# Patient Record
Sex: MALE | Race: WHITE | Employment: OTHER
[De-identification: names, ages, dates, MRNs, and addresses within clinical notes are randomized per-mention and may not be internally consistent; named-entity substitution may affect disease eponyms.]

---

## 2024-08-07 ENCOUNTER — APPOINTMENT (OUTPATIENT)
Facility: HOSPITAL | Age: 64
DRG: 308 | End: 2024-08-07
Payer: MEDICARE

## 2024-08-07 ENCOUNTER — HOSPITAL ENCOUNTER (INPATIENT)
Facility: HOSPITAL | Age: 64
LOS: 7 days | Discharge: HOME HEALTH | DRG: 308 | End: 2024-08-14
Attending: EMERGENCY MEDICINE | Admitting: INTERNAL MEDICINE
Payer: MEDICARE

## 2024-08-07 DIAGNOSIS — I48.91 ATRIAL FIBRILLATION WITH RAPID VENTRICULAR RESPONSE (HCC): Primary | ICD-10-CM

## 2024-08-07 DIAGNOSIS — I42.9 CARDIOMYOPATHY, UNSPECIFIED TYPE (HCC): ICD-10-CM

## 2024-08-07 PROBLEM — I10 PRIMARY HYPERTENSION: Chronic | Status: ACTIVE | Noted: 2024-08-07

## 2024-08-07 PROBLEM — J44.9 COPD (CHRONIC OBSTRUCTIVE PULMONARY DISEASE) (HCC): Chronic | Status: ACTIVE | Noted: 2024-08-07

## 2024-08-07 LAB
ALBUMIN SERPL-MCNC: 4.1 G/DL (ref 3.5–5)
ALBUMIN SERPL-MCNC: 4.1 G/DL (ref 3.5–5)
ALBUMIN/GLOB SERPL: 1 (ref 1.1–2.2)
ALBUMIN/GLOB SERPL: 1.1 (ref 1.1–2.2)
ALP SERPL-CCNC: 130 U/L (ref 45–117)
ALP SERPL-CCNC: 136 U/L (ref 45–117)
ALT SERPL-CCNC: 31 U/L (ref 12–78)
ALT SERPL-CCNC: 32 U/L (ref 12–78)
ANION GAP SERPL CALC-SCNC: 6 MMOL/L (ref 5–15)
ANION GAP SERPL CALC-SCNC: 7 MMOL/L (ref 5–15)
APPEARANCE UR: CLEAR
APTT PPP: >153 SEC (ref 21.2–34.1)
AST SERPL W P-5'-P-CCNC: 22 U/L (ref 15–37)
AST SERPL W P-5'-P-CCNC: 27 U/L (ref 15–37)
BACTERIA URNS QL MICRO: NEGATIVE /HPF
BASOPHILS # BLD: 0.1 K/UL (ref 0–0.1)
BASOPHILS NFR BLD: 1 % (ref 0–1)
BILIRUB SERPL-MCNC: 0.9 MG/DL (ref 0.2–1)
BILIRUB SERPL-MCNC: 1.1 MG/DL (ref 0.2–1)
BILIRUB UR QL: NEGATIVE
BUN SERPL-MCNC: 8 MG/DL (ref 6–20)
BUN SERPL-MCNC: 9 MG/DL (ref 6–20)
BUN/CREAT SERPL: 7 (ref 12–20)
BUN/CREAT SERPL: 8 (ref 12–20)
CA-I BLD-MCNC: 9.3 MG/DL (ref 8.5–10.1)
CA-I BLD-MCNC: 9.8 MG/DL (ref 8.5–10.1)
CHLORIDE SERPL-SCNC: 97 MMOL/L (ref 97–108)
CHLORIDE SERPL-SCNC: 97 MMOL/L (ref 97–108)
CO2 SERPL-SCNC: 29 MMOL/L (ref 21–32)
CO2 SERPL-SCNC: 29 MMOL/L (ref 21–32)
COLOR UR: NORMAL
CREAT SERPL-MCNC: 1.11 MG/DL (ref 0.7–1.3)
CREAT SERPL-MCNC: 1.12 MG/DL (ref 0.7–1.3)
D DIMER PPP FEU-MCNC: 0.34 UG/ML(FEU)
D DIMER PPP FEU-MCNC: <0.27 UG/ML(FEU)
DIFFERENTIAL METHOD BLD: ABNORMAL
ECHO AO ASC DIAM: 4.1 CM
ECHO AO ASCENDING AORTA INDEX: 2.46 CM/M2
ECHO AO ROOT DIAM: 4.1 CM
ECHO AO ROOT INDEX: 2.46 CM/M2
ECHO AV AREA PEAK VELOCITY: 2.2 CM2
ECHO AV AREA VTI: 1.6 CM2
ECHO AV AREA/BSA PEAK VELOCITY: 1.3 CM2/M2
ECHO AV AREA/BSA VTI: 1 CM2/M2
ECHO AV MEAN GRADIENT: 2 MMHG
ECHO AV MEAN VELOCITY: 0.7 M/S
ECHO AV PEAK GRADIENT: 3 MMHG
ECHO AV PEAK VELOCITY: 0.9 M/S
ECHO AV VELOCITY RATIO: 0.67
ECHO AV VTI: 15.5 CM
ECHO BSA: 1.66 M2
ECHO EST RA PRESSURE: 8 MMHG
ECHO IVC PROX: 1.9 CM
ECHO LA AREA 2C: 6.7 CM2
ECHO LA AREA 4C: 9.6 CM2
ECHO LA DIAMETER INDEX: 1.62 CM/M2
ECHO LA DIAMETER: 2.7 CM
ECHO LA MAJOR AXIS: 3.7 CM
ECHO LA MINOR AXIS: 2.5 CM
ECHO LA TO AORTIC ROOT RATIO: 0.66
ECHO LA VOL MOD A2C: 14 ML (ref 18–58)
ECHO LA VOL MOD A4C: 19 ML (ref 18–58)
ECHO LA VOLUME INDEX MOD A2C: 8 ML/M2 (ref 16–34)
ECHO LA VOLUME INDEX MOD A4C: 11 ML/M2 (ref 16–34)
ECHO LV E' LATERAL VELOCITY: 11 CM/S
ECHO LV E' SEPTAL VELOCITY: 7 CM/S
ECHO LV FRACTIONAL SHORTENING: 8 % (ref 28–44)
ECHO LV GLOBAL LONGITUDINAL STRAIN (GLS): -3.6 %
ECHO LV GLOBAL LONGITUDINAL STRAIN (GLS): -4.9 %
ECHO LV GLOBAL LONGITUDINAL STRAIN (GLS): -5.4 %
ECHO LV GLOBAL LONGITUDINAL STRAIN (GLS): -5.7 %
ECHO LV INTERNAL DIMENSION DIASTOLE INDEX: 2.34 CM/M2
ECHO LV INTERNAL DIMENSION DIASTOLIC: 3.9 CM (ref 4.2–5.9)
ECHO LV INTERNAL DIMENSION SYSTOLIC INDEX: 2.16 CM/M2
ECHO LV INTERNAL DIMENSION SYSTOLIC: 3.6 CM
ECHO LV IVSD: 1.5 CM (ref 0.6–1)
ECHO LV MASS 2D: 190.4 G (ref 88–224)
ECHO LV MASS INDEX 2D: 114 G/M2 (ref 49–115)
ECHO LV POSTERIOR WALL DIASTOLIC: 1.2 CM (ref 0.6–1)
ECHO LV RELATIVE WALL THICKNESS RATIO: 0.62
ECHO LVOT AREA: 3.1 CM2
ECHO LVOT AV VTI INDEX: 0.5
ECHO LVOT DIAM: 2 CM
ECHO LVOT MEAN GRADIENT: 1 MMHG
ECHO LVOT PEAK GRADIENT: 1 MMHG
ECHO LVOT PEAK VELOCITY: 0.6 M/S
ECHO LVOT STROKE VOLUME INDEX: 14.5 ML/M2
ECHO LVOT SV: 24.2 ML
ECHO LVOT VTI: 7.7 CM
ECHO MV A VELOCITY: 0.38 M/S
ECHO MV AREA VTI: 1.1 CM2
ECHO MV E DECELERATION TIME (DT): 211 MS
ECHO MV E VELOCITY: 0.69 M/S
ECHO MV E/A RATIO: 1.82
ECHO MV E/E' LATERAL: 6.27
ECHO MV E/E' RATIO (AVERAGED): 8.06
ECHO MV E/E' SEPTAL: 9.86
ECHO MV LVOT VTI INDEX: 2.74
ECHO MV MAX VELOCITY: 0.9 M/S
ECHO MV MEAN GRADIENT: 1 MMHG
ECHO MV MEAN VELOCITY: 0.5 M/S
ECHO MV PEAK GRADIENT: 3 MMHG
ECHO MV VTI: 21.1 CM
ECHO PV ACCELERATION TIME (AT): 74 MS
ECHO PV MAX VELOCITY: 0.7 M/S
ECHO PV MEAN GRADIENT: 1 MMHG
ECHO PV MEAN VELOCITY: 0.5 M/S
ECHO PV PEAK GRADIENT: 2 MMHG
ECHO PV VTI: 12.6 CM
ECHO RA AREA 4C: 15.1 CM2
ECHO RA END SYSTOLIC VOLUME APICAL 4 CHAMBER INDEX BSA: 24 ML/M2
ECHO RA VOLUME: 40 ML
ECHO RIGHT VENTRICULAR SYSTOLIC PRESSURE (RVSP): 26 MMHG
ECHO RV BASAL DIMENSION: 3.2 CM
ECHO RV FREE WALL PEAK S': 10 CM/S
ECHO RV LONGITUDINAL DIMENSION: 6 CM
ECHO RV MID DIMENSION: 1.8 CM
ECHO RV TAPSE: 1.3 CM (ref 1.7–?)
ECHO RVOT MEAN GRADIENT: 0 MMHG
ECHO RVOT PEAK GRADIENT: 1 MMHG
ECHO RVOT PEAK VELOCITY: 0.5 M/S
ECHO RVOT VTI: 7.4 CM
ECHO TV REGURGITANT MAX VELOCITY: 2.14 M/S
ECHO TV REGURGITANT PEAK GRADIENT: 18 MMHG
EOSINOPHIL # BLD: 0.2 K/UL (ref 0–0.4)
EOSINOPHIL NFR BLD: 2 % (ref 0–7)
EPITH CASTS URNS QL MICRO: NORMAL /LPF
ERYTHROCYTE [DISTWIDTH] IN BLOOD BY AUTOMATED COUNT: 11.6 % (ref 11.5–14.5)
ERYTHROCYTE [DISTWIDTH] IN BLOOD BY AUTOMATED COUNT: 11.6 % (ref 11.5–14.5)
GLOBULIN SER CALC-MCNC: 3.6 G/DL (ref 2–4)
GLOBULIN SER CALC-MCNC: 4.1 G/DL (ref 2–4)
GLUCOSE SERPL-MCNC: 122 MG/DL (ref 65–100)
GLUCOSE SERPL-MCNC: 140 MG/DL (ref 65–100)
GLUCOSE UR STRIP.AUTO-MCNC: NEGATIVE MG/DL
HCT VFR BLD AUTO: 40.7 % (ref 36.6–50.3)
HCT VFR BLD AUTO: 46.7 % (ref 36.6–50.3)
HGB BLD-MCNC: 14.3 G/DL (ref 12.1–17)
HGB BLD-MCNC: 16.3 G/DL (ref 12.1–17)
HGB UR QL STRIP: NEGATIVE
IMM GRANULOCYTES # BLD AUTO: 0 K/UL (ref 0–0.04)
IMM GRANULOCYTES NFR BLD AUTO: 0 % (ref 0–0.5)
INR PPP: 1 (ref 0.9–1.1)
INR PPP: 1.2 (ref 0.9–1.1)
KETONES UR QL STRIP.AUTO: NEGATIVE MG/DL
LACTATE BLD-SCNC: 1.59 MMOL/L (ref 0.4–2)
LEUKOCYTE ESTERASE UR QL STRIP.AUTO: NEGATIVE
LYMPHOCYTES # BLD: 1.6 K/UL (ref 0.8–3.5)
LYMPHOCYTES NFR BLD: 14 % (ref 12–49)
MAGNESIUM SERPL-MCNC: 2 MG/DL (ref 1.6–2.4)
MAGNESIUM SERPL-MCNC: 2.4 MG/DL (ref 1.6–2.4)
MCH RBC QN AUTO: 31.7 PG (ref 26–34)
MCH RBC QN AUTO: 32.1 PG (ref 26–34)
MCHC RBC AUTO-ENTMCNC: 34.9 G/DL (ref 30–36.5)
MCHC RBC AUTO-ENTMCNC: 35.1 G/DL (ref 30–36.5)
MCV RBC AUTO: 90.7 FL (ref 80–99)
MCV RBC AUTO: 91.5 FL (ref 80–99)
MONOCYTES # BLD: 0.7 K/UL (ref 0–1)
MONOCYTES NFR BLD: 7 % (ref 5–13)
MUCOUS THREADS URNS QL MICRO: NORMAL /LPF
NEUTS SEG # BLD: 8.8 K/UL (ref 1.8–8)
NEUTS SEG NFR BLD: 76 % (ref 32–75)
NITRITE UR QL STRIP.AUTO: NEGATIVE
NRBC # BLD: 0 K/UL (ref 0–0.01)
NRBC # BLD: 0 K/UL (ref 0–0.01)
NRBC BLD-RTO: 0 PER 100 WBC
NRBC BLD-RTO: 0 PER 100 WBC
PERFORMED BY:: NORMAL
PERFORMED BY:: NORMAL
PH UR STRIP: 7 (ref 5–8)
PLATELET # BLD AUTO: 340 K/UL (ref 150–400)
PLATELET # BLD AUTO: 390 K/UL (ref 150–400)
PMV BLD AUTO: 8.1 FL (ref 8.9–12.9)
PMV BLD AUTO: 8.3 FL (ref 8.9–12.9)
POTASSIUM SERPL-SCNC: 3.9 MMOL/L (ref 3.5–5.1)
POTASSIUM SERPL-SCNC: 4.7 MMOL/L (ref 3.5–5.1)
PROCALCITONIN SERPL-MCNC: <0.05 NG/ML
PROCALCITONIN SERPL-MCNC: <0.05 NG/ML
PROT SERPL-MCNC: 7.7 G/DL (ref 6.4–8.2)
PROT SERPL-MCNC: 8.2 G/DL (ref 6.4–8.2)
PROT UR STRIP-MCNC: NEGATIVE MG/DL
PROTHROMBIN TIME: 13 SEC (ref 11.9–14.6)
PROTHROMBIN TIME: 15 SEC (ref 11.9–14.6)
RBC # BLD AUTO: 4.45 M/UL (ref 4.1–5.7)
RBC # BLD AUTO: 5.15 M/UL (ref 4.1–5.7)
RBC #/AREA URNS HPF: NORMAL /HPF (ref 0–5)
SODIUM SERPL-SCNC: 132 MMOL/L (ref 136–145)
SODIUM SERPL-SCNC: 133 MMOL/L (ref 136–145)
SP GR UR REFRACTOMETRY: 1.01 (ref 1–1.03)
T4 FREE SERPL-MCNC: 1 NG/DL (ref 0.8–1.5)
THERAPEUTIC RANGE: ABNORMAL SEC (ref 82–109)
TROPONIN I SERPL HS-MCNC: 60 NG/L (ref 0–76)
TSH SERPL DL<=0.05 MIU/L-ACNC: 1.3 UIU/ML (ref 0.36–3.74)
TSH SERPL DL<=0.05 MIU/L-ACNC: 31.8 UIU/ML (ref 0.36–3.74)
UFH PPP CHRO-ACNC: 0.7 IU/ML
URINE CULTURE IF INDICATED: NORMAL
UROBILINOGEN UR QL STRIP.AUTO: 0.1 EU/DL (ref 0.1–1)
WBC # BLD AUTO: 11.4 K/UL (ref 4.1–11.1)
WBC # BLD AUTO: 11.6 K/UL (ref 4.1–11.1)
WBC URNS QL MICRO: NORMAL /HPF (ref 0–4)

## 2024-08-07 PROCEDURE — 2060000000 HC ICU INTERMEDIATE R&B

## 2024-08-07 PROCEDURE — 36415 COLL VENOUS BLD VENIPUNCTURE: CPT

## 2024-08-07 PROCEDURE — 85610 PROTHROMBIN TIME: CPT

## 2024-08-07 PROCEDURE — 81001 URINALYSIS AUTO W/SCOPE: CPT

## 2024-08-07 PROCEDURE — 6370000000 HC RX 637 (ALT 250 FOR IP): Performed by: NURSE PRACTITIONER

## 2024-08-07 PROCEDURE — 84439 ASSAY OF FREE THYROXINE: CPT

## 2024-08-07 PROCEDURE — 96375 TX/PRO/DX INJ NEW DRUG ADDON: CPT

## 2024-08-07 PROCEDURE — 83735 ASSAY OF MAGNESIUM: CPT

## 2024-08-07 PROCEDURE — 94640 AIRWAY INHALATION TREATMENT: CPT

## 2024-08-07 PROCEDURE — 6370000000 HC RX 637 (ALT 250 FOR IP): Performed by: EMERGENCY MEDICINE

## 2024-08-07 PROCEDURE — 99285 EMERGENCY DEPT VISIT HI MDM: CPT

## 2024-08-07 PROCEDURE — 85027 COMPLETE CBC AUTOMATED: CPT

## 2024-08-07 PROCEDURE — 87040 BLOOD CULTURE FOR BACTERIA: CPT

## 2024-08-07 PROCEDURE — 85520 HEPARIN ASSAY: CPT

## 2024-08-07 PROCEDURE — 96374 THER/PROPH/DIAG INJ IV PUSH: CPT

## 2024-08-07 PROCEDURE — 80053 COMPREHEN METABOLIC PANEL: CPT

## 2024-08-07 PROCEDURE — 84145 PROCALCITONIN (PCT): CPT

## 2024-08-07 PROCEDURE — 2580000003 HC RX 258: Performed by: EMERGENCY MEDICINE

## 2024-08-07 PROCEDURE — 2500000003 HC RX 250 WO HCPCS: Performed by: EMERGENCY MEDICINE

## 2024-08-07 PROCEDURE — 85379 FIBRIN DEGRADATION QUANT: CPT

## 2024-08-07 PROCEDURE — 85025 COMPLETE CBC W/AUTO DIFF WBC: CPT

## 2024-08-07 PROCEDURE — 6360000002 HC RX W HCPCS: Performed by: EMERGENCY MEDICINE

## 2024-08-07 PROCEDURE — 84484 ASSAY OF TROPONIN QUANT: CPT

## 2024-08-07 PROCEDURE — 83605 ASSAY OF LACTIC ACID: CPT

## 2024-08-07 PROCEDURE — 94761 N-INVAS EAR/PLS OXIMETRY MLT: CPT

## 2024-08-07 PROCEDURE — 6360000002 HC RX W HCPCS: Performed by: INTERNAL MEDICINE

## 2024-08-07 PROCEDURE — 84443 ASSAY THYROID STIM HORMONE: CPT

## 2024-08-07 PROCEDURE — 85730 THROMBOPLASTIN TIME PARTIAL: CPT

## 2024-08-07 PROCEDURE — 93306 TTE W/DOPPLER COMPLETE: CPT

## 2024-08-07 PROCEDURE — 93005 ELECTROCARDIOGRAM TRACING: CPT | Performed by: EMERGENCY MEDICINE

## 2024-08-07 PROCEDURE — 71045 X-RAY EXAM CHEST 1 VIEW: CPT

## 2024-08-07 RX ORDER — LISINOPRIL 5 MG/1
5 TABLET ORAL DAILY
Status: DISCONTINUED | OUTPATIENT
Start: 2024-08-07 | End: 2024-08-09

## 2024-08-07 RX ORDER — BUDESONIDE AND FORMOTEROL FUMARATE DIHYDRATE 160; 4.5 UG/1; UG/1
2 AEROSOL RESPIRATORY (INHALATION)
Status: DISCONTINUED | OUTPATIENT
Start: 2024-08-07 | End: 2024-08-14 | Stop reason: HOSPADM

## 2024-08-07 RX ORDER — ACETAMINOPHEN 325 MG/1
650 TABLET ORAL EVERY 6 HOURS PRN
Status: DISCONTINUED | OUTPATIENT
Start: 2024-08-07 | End: 2024-08-14 | Stop reason: HOSPADM

## 2024-08-07 RX ORDER — HEPARIN SODIUM 1000 [USP'U]/ML
60 INJECTION, SOLUTION INTRAVENOUS; SUBCUTANEOUS PRN
Status: DISCONTINUED | OUTPATIENT
Start: 2024-08-07 | End: 2024-08-13 | Stop reason: ALTCHOICE

## 2024-08-07 RX ORDER — POTASSIUM CHLORIDE 20 MEQ/1
40 TABLET, EXTENDED RELEASE ORAL PRN
Status: DISCONTINUED | OUTPATIENT
Start: 2024-08-07 | End: 2024-08-14 | Stop reason: HOSPADM

## 2024-08-07 RX ORDER — IPRATROPIUM BROMIDE AND ALBUTEROL SULFATE 2.5; .5 MG/3ML; MG/3ML
1 SOLUTION RESPIRATORY (INHALATION)
Status: COMPLETED | OUTPATIENT
Start: 2024-08-07 | End: 2024-08-07

## 2024-08-07 RX ORDER — METOPROLOL SUCCINATE 25 MG/1
25 TABLET, EXTENDED RELEASE ORAL DAILY
Status: DISCONTINUED | OUTPATIENT
Start: 2024-08-07 | End: 2024-08-07

## 2024-08-07 RX ORDER — SODIUM CHLORIDE 9 MG/ML
INJECTION, SOLUTION INTRAVENOUS CONTINUOUS
Status: DISCONTINUED | OUTPATIENT
Start: 2024-08-07 | End: 2024-08-09

## 2024-08-07 RX ORDER — ASPIRIN 81 MG/1
81 TABLET ORAL DAILY
COMMUNITY

## 2024-08-07 RX ORDER — ACETAMINOPHEN 650 MG/1
650 SUPPOSITORY RECTAL EVERY 6 HOURS PRN
Status: DISCONTINUED | OUTPATIENT
Start: 2024-08-07 | End: 2024-08-14 | Stop reason: HOSPADM

## 2024-08-07 RX ORDER — POLYETHYLENE GLYCOL 3350 17 G/17G
17 POWDER, FOR SOLUTION ORAL DAILY PRN
Status: DISCONTINUED | OUTPATIENT
Start: 2024-08-07 | End: 2024-08-14 | Stop reason: HOSPADM

## 2024-08-07 RX ORDER — 0.9 % SODIUM CHLORIDE 0.9 %
1000 INTRAVENOUS SOLUTION INTRAVENOUS ONCE
Status: COMPLETED | OUTPATIENT
Start: 2024-08-07 | End: 2024-08-07

## 2024-08-07 RX ORDER — METOPROLOL TARTRATE 1 MG/ML
5 INJECTION, SOLUTION INTRAVENOUS
Status: COMPLETED | OUTPATIENT
Start: 2024-08-07 | End: 2024-08-07

## 2024-08-07 RX ORDER — HEPARIN SODIUM 10000 [USP'U]/100ML
5-30 INJECTION, SOLUTION INTRAVENOUS CONTINUOUS
Status: DISCONTINUED | OUTPATIENT
Start: 2024-08-07 | End: 2024-08-13 | Stop reason: ALTCHOICE

## 2024-08-07 RX ORDER — ALBUTEROL SULFATE 90 UG/1
2 AEROSOL, METERED RESPIRATORY (INHALATION) EVERY 4 HOURS PRN
Status: DISCONTINUED | OUTPATIENT
Start: 2024-08-07 | End: 2024-08-14 | Stop reason: HOSPADM

## 2024-08-07 RX ORDER — POTASSIUM CHLORIDE 7.45 MG/ML
10 INJECTION INTRAVENOUS PRN
Status: DISCONTINUED | OUTPATIENT
Start: 2024-08-07 | End: 2024-08-14 | Stop reason: HOSPADM

## 2024-08-07 RX ORDER — ENOXAPARIN SODIUM 100 MG/ML
40 INJECTION SUBCUTANEOUS DAILY
Status: DISCONTINUED | OUTPATIENT
Start: 2024-08-07 | End: 2024-08-07

## 2024-08-07 RX ORDER — MAGNESIUM SULFATE IN WATER 40 MG/ML
2000 INJECTION, SOLUTION INTRAVENOUS PRN
Status: DISCONTINUED | OUTPATIENT
Start: 2024-08-07 | End: 2024-08-14 | Stop reason: HOSPADM

## 2024-08-07 RX ORDER — ONDANSETRON 2 MG/ML
4 INJECTION INTRAMUSCULAR; INTRAVENOUS EVERY 6 HOURS PRN
Status: DISCONTINUED | OUTPATIENT
Start: 2024-08-07 | End: 2024-08-14 | Stop reason: HOSPADM

## 2024-08-07 RX ORDER — HEPARIN SODIUM 1000 [USP'U]/ML
30 INJECTION, SOLUTION INTRAVENOUS; SUBCUTANEOUS PRN
Status: DISCONTINUED | OUTPATIENT
Start: 2024-08-07 | End: 2024-08-13

## 2024-08-07 RX ORDER — DILTIAZEM HYDROCHLORIDE 5 MG/ML
20 INJECTION INTRAVENOUS ONCE
Status: COMPLETED | OUTPATIENT
Start: 2024-08-07 | End: 2024-08-07

## 2024-08-07 RX ORDER — ONDANSETRON 4 MG/1
4 TABLET, ORALLY DISINTEGRATING ORAL EVERY 8 HOURS PRN
Status: DISCONTINUED | OUTPATIENT
Start: 2024-08-07 | End: 2024-08-14 | Stop reason: HOSPADM

## 2024-08-07 RX ORDER — DIPHENHYDRAMINE HYDROCHLORIDE 50 MG/ML
25 INJECTION INTRAMUSCULAR; INTRAVENOUS
Status: COMPLETED | OUTPATIENT
Start: 2024-08-07 | End: 2024-08-07

## 2024-08-07 RX ORDER — HEPARIN SODIUM 1000 [USP'U]/ML
60 INJECTION, SOLUTION INTRAVENOUS; SUBCUTANEOUS ONCE
Status: COMPLETED | OUTPATIENT
Start: 2024-08-07 | End: 2024-08-07

## 2024-08-07 RX ADMIN — METOPROLOL TARTRATE 5 MG: 5 INJECTION INTRAVENOUS at 13:06

## 2024-08-07 RX ADMIN — AMIODARONE HYDROCHLORIDE 150 MG: 1.5 INJECTION, SOLUTION INTRAVENOUS at 15:11

## 2024-08-07 RX ADMIN — HEPARIN SODIUM 12 UNITS/KG/HR: 10000 INJECTION, SOLUTION INTRAVENOUS at 17:13

## 2024-08-07 RX ADMIN — IPRATROPIUM BROMIDE AND ALBUTEROL SULFATE 1 DOSE: 2.5; .5 SOLUTION RESPIRATORY (INHALATION) at 13:05

## 2024-08-07 RX ADMIN — LISINOPRIL 5 MG: 5 TABLET ORAL at 20:40

## 2024-08-07 RX ADMIN — AMIODARONE HYDROCHLORIDE 0.5 MG/MIN: 1.8 INJECTION, SOLUTION INTRAVENOUS at 20:57

## 2024-08-07 RX ADMIN — DIPHENHYDRAMINE HYDROCHLORIDE 25 MG: 50 INJECTION INTRAMUSCULAR; INTRAVENOUS at 11:38

## 2024-08-07 RX ADMIN — DILTIAZEM HYDROCHLORIDE 20 MG: 5 INJECTION, SOLUTION INTRAVENOUS at 11:12

## 2024-08-07 RX ADMIN — CEFTRIAXONE SODIUM 1000 MG: 1 INJECTION, POWDER, FOR SOLUTION INTRAMUSCULAR; INTRAVENOUS at 12:03

## 2024-08-07 RX ADMIN — METHYLPREDNISOLONE SODIUM SUCCINATE 125 MG: 125 INJECTION INTRAMUSCULAR; INTRAVENOUS at 11:41

## 2024-08-07 RX ADMIN — AMIODARONE HYDROCHLORIDE 1 MG/MIN: 1.8 INJECTION, SOLUTION INTRAVENOUS at 15:38

## 2024-08-07 RX ADMIN — METOPROLOL SUCCINATE 25 MG: 25 TABLET, EXTENDED RELEASE ORAL at 11:33

## 2024-08-07 RX ADMIN — HEPARIN SODIUM 3500 UNITS: 1000 INJECTION INTRAVENOUS; SUBCUTANEOUS at 17:17

## 2024-08-07 RX ADMIN — Medication 2 PUFF: at 20:02

## 2024-08-07 RX ADMIN — METOPROLOL TARTRATE 25 MG: 25 TABLET, FILM COATED ORAL at 20:40

## 2024-08-07 RX ADMIN — SODIUM CHLORIDE 1000 ML: 9 INJECTION, SOLUTION INTRAVENOUS at 11:38

## 2024-08-07 ASSESSMENT — PAIN SCALES - GENERAL
PAINLEVEL_OUTOF10: 0

## 2024-08-07 ASSESSMENT — PAIN - FUNCTIONAL ASSESSMENT: PAIN_FUNCTIONAL_ASSESSMENT: 0-10

## 2024-08-07 ASSESSMENT — LIFESTYLE VARIABLES
HOW MANY STANDARD DRINKS CONTAINING ALCOHOL DO YOU HAVE ON A TYPICAL DAY: PATIENT DOES NOT DRINK
HOW OFTEN DO YOU HAVE A DRINK CONTAINING ALCOHOL: NEVER

## 2024-08-07 NOTE — CARE COORDINATION
08/07/24 1718   Service Assessment   Patient Orientation Alert and Oriented   Cognition Alert   History Provided By Patient   Primary Caregiver Self   Accompanied By/Relationship Pt alone @ this time.   Support Systems Family Members;Friends/Neighbors   Patient's Healthcare Decision Maker is: Legal Next of Kin   PCP Verified by CM Yes  (Seen by Pt 1st MD - Nov 2022.)   Last Visit to PCP   (Nov 2022.)   Prior Functional Level Independent in ADLs/IADLs   Current Functional Level Independent in ADLs/IADLs   Can patient return to prior living arrangement Yes   Ability to make needs known: Good   Family able to assist with home care needs: Yes   Would you like for me to discuss the discharge plan with any other family members/significant others, and if so, who? Yes  (Sister Tess Medina)   Financial Resources Medicare   Community Resources None   CM/SW Referral Other (see comment)  (None)   Social/Functional History   Lives With Friend(s)   Type of Home Trailer   Home Layout One level   Home Access Stairs to enter with rails   Entrance Stairs - Number of Steps 4   Bathroom Shower/Tub Tub/Shower unit   Bathroom Toilet Standard   Bathroom Equipment None   Bathroom Accessibility Accessible   Home Equipment None   Receives Help From Friend(s);Family   ADL Assistance Independent   Homemaking Assistance Independent   Homemaking Responsibilities Yes   Ambulation Assistance Independent   Transfer Assistance Independent   Active  Yes   Occupation Retired   Discharge Planning   Type of Residence Trailer/Mobile Home   Living Arrangements Friends   Current Services Prior To Admission None   Potential Assistance Needed N/A   DME Ordered? No   Type of Home Care Services None   Patient expects to be discharged to: Trailer/mobile home   One/Two Story Residence One story   History of falls? 0   Services At/After Discharge   Transition of Care Consult (CM Consult) Discharge Planning   Services At/After Discharge None

## 2024-08-07 NOTE — H&P
Hospitalist Admission Note    NAME: Andrea Rubio   :  1960   MRN:  [id number]     Date/Time:  2024 3:12 PM    Patient PCP: No primary care provider on file.    ______________________________________________________________________  Given the patient's current clinical presentation, I have a high level of concern for decompensation if discharged from the emergency department.  Complex decision making was performed, which includes reviewing the patient's available past medical records, laboratory results, and x-ray films.       My assessment of this patient's clinical condition and my plan of care is as follows.    Assessment / Plan:    Atrial fibrillation with RVR  - complaining of shortness of breath and palpitations for the past 2-3 days  - Initial EKG: Afib with RVR: ; no ischemic changes  - Repeat EKG: Afib: HR 86, no ischemic changes  - Received IV Diltiazem 20 mg in the ED, HR improved to the 90s  - Developed allergic reaction at insertion site; received IV benadryl with symptom relief  - Received IV bolus of amiodarone and started on amiodarone gtt  - Echo pending  - Check magnesium and K  - Check TSH  - Cardiology consult pending  - CHADSVASC 2; continue 81 mg asa; DOAC pending cardiology recommendations    HTN:   - Blood pressure uncontrolled  - Home medications included: lisinopril but patient has not taken medications cost prohibitive; no insurance  - Received metoprolol 25 mg daily  - will start patient on lisinopril 5 mg daily and titrate as needed    COPD  - Does not appear to be in acute exacerbation  -  ppd smoker 50+ years; quit 1.5 years ago  - Chest x-ray showed no acute process  - Patient was using OTC Primatene Mist; unable to specify frequency  - Will start patient on Symbicort and albuterol inhalers      Medical Decision Making:   I personally reviewed labs: UA, Procal, POC lactic acid, D-dimer, TSH, CMP, Mg, Trop, CBC  I personally reviewed imaging:chest

## 2024-08-07 NOTE — ED PROVIDER NOTES
[facility] EMERGENCY DEPT  EMERGENCY DEPARTMENT HISTORY AND PHYSICAL EXAM      Date: 8/7/2024  Patient Name: Andrea Rubio  MRN: [id number]  Birthdate 1960  Date of evaluation: 8/7/2024  Provider: Bita Horton MD   Note Started: 1:07 PM EDT 8/7/24    HISTORY OF PRESENT ILLNESS     Chief Complaint   Patient presents with    Irregular Heart Beat       History Provided By: Patient    HPI: Andrea Rubio is a 64 y.o. male patient is known no medical medication at home took a Primatene Mist when he felt short of breath and then his heart rate went up to the 180s to 100.  EMS presented gave him a dose of metoprolol did help some.  Is still feeling short of breath but better.    PAST MEDICAL HISTORY   Past Medical History:  No past medical history on file.    Past Surgical History:  No past surgical history on file.    Family History:  No family history on file.    Social History:       Allergies:  Allergies   Allergen Reactions    Cardizem [Diltiazem] Hives and Itching       PCP: No primary care provider on file.    Current Meds:   Current Facility-Administered Medications   Medication Dose Route Frequency Provider Last Rate Last Admin    metoprolol succinate (TOPROL XL) extended release tablet 25 mg  25 mg Oral Daily Bita Horton MD   25 mg at 08/07/24 1133    amiodarone (NEXTERONE) 150 mg in dextrose 5% 100 ml  150 mg IntraVENous Once Bita Horton MD        Followed by    amiodarone (NEXTERONE) 360 mg in dextrose 5% 200 ml  1 mg/min IntraVENous Continuous Bita Horton MD        Followed by    amiodarone (NEXTERONE) 360 mg in dextrose 5% 200 ml  0.5 mg/min IntraVENous Continuous Bita Horton MD         No current outpatient medications on file.       Social Determinants of Health:   Social Determinants of Health     Tobacco Use: Not on file   Alcohol Use: Not At Risk (8/7/2024)    AUDIT-C     Frequency of Alcohol Consumption: Never     Average Number of Drinks: Patient does not drink

## 2024-08-07 NOTE — ED TRIAGE NOTES
Pt arrives to ED by EMS from home. Pt reports he took primatine mist this morning and started feeling SOB and having palpitations. EMS reported HR was 210 with afib RVR. EMS gave 5mg metoprolol. Hx of COPD. No hx of afib

## 2024-08-07 NOTE — ED NOTES
After administration of diltizem, pt developed localized rash/hives to L arm, reports itching, denies SOB. Dr. Horton aware, additional orders received, allergy updated  
Echo at bedside  
Pt had brief period where he went unresponsive, only responsive to painful stimuli. HR noted to be 55bpm, BP 89/65, room air sat of 90%. Dr. Horton at bedside, orders received.   
range)     Or   potassium chloride 10 mEq/100 mL IVPB (Peripheral Line) (has no administration in time range)   magnesium sulfate 2000 mg in 50 mL IVPB premix (has no administration in time range)   ondansetron (ZOFRAN-ODT) disintegrating tablet 4 mg (has no administration in time range)     Or   ondansetron (ZOFRAN) injection 4 mg (has no administration in time range)   polyethylene glycol (GLYCOLAX) packet 17 g (has no administration in time range)   acetaminophen (TYLENOL) tablet 650 mg (has no administration in time range)     Or   acetaminophen (TYLENOL) suppository 650 mg (has no administration in time range)   0.9 % sodium chloride infusion (has no administration in time range)   lisinopril (PRINIVIL;ZESTRIL) tablet 5 mg (has no administration in time range)   budesonide-formoterol (SYMBICORT) 160-4.5 MCG/ACT inhaler 2 puff (has no administration in time range)   albuterol sulfate HFA (PROVENTIL;VENTOLIN;PROAIR) 108 (90 Base) MCG/ACT inhaler 2 puff (has no administration in time range)   heparin (porcine) injection 3,500 Units (has no administration in time range)   heparin (porcine) injection 1,800 Units (has no administration in time range)   heparin 25,000 units in dextrose 5% 250 mL (premix) infusion (12 Units/kg/hr × 59 kg IntraVENous New Bag 8/7/24 1713)   metoprolol tartrate (LOPRESSOR) tablet 25 mg (has no administration in time range)   dilTIAZem injection 20 mg (20 mg IntraVENous Given 8/7/24 1112)   sodium chloride 0.9 % bolus 1,000 mL (0 mLs IntraVENous Stopped 8/7/24 1209)   cefTRIAXone (ROCEPHIN) 1,000 mg in sterile water 10 mL IV syringe (1,000 mg IntraVENous Given 8/7/24 1203)   methylPREDNISolone sodium succ (SOLU-MEDROL) 125 mg in sterile water 2 mL injection (125 mg IntraVENous Given 8/7/24 1141)   diphenhydrAMINE (BENADRYL) injection 25 mg (25 mg IntraVENous Given 8/7/24 1138)   ipratropium 0.5 mg-albuterol 2.5 mg (DUONEB) nebulizer solution 1 Dose (1 Dose Inhalation Given 8/7/24 6811)

## 2024-08-08 LAB
ALBUMIN SERPL-MCNC: 3.4 G/DL (ref 3.5–5)
ALBUMIN/GLOB SERPL: 1 (ref 1.1–2.2)
ALP SERPL-CCNC: 111 U/L (ref 45–117)
ALT SERPL-CCNC: 34 U/L (ref 12–78)
ANION GAP SERPL CALC-SCNC: 5 MMOL/L (ref 5–15)
AST SERPL W P-5'-P-CCNC: 21 U/L (ref 15–37)
BILIRUB SERPL-MCNC: 0.5 MG/DL (ref 0.2–1)
BNP SERPL-MCNC: 1482 PG/ML
BUN SERPL-MCNC: 14 MG/DL (ref 6–20)
BUN/CREAT SERPL: 12 (ref 12–20)
CA-I BLD-MCNC: 9.6 MG/DL (ref 8.5–10.1)
CHLORIDE SERPL-SCNC: 96 MMOL/L (ref 97–108)
CO2 SERPL-SCNC: 29 MMOL/L (ref 21–32)
CREAT SERPL-MCNC: 1.14 MG/DL (ref 0.7–1.3)
EKG ATRIAL RATE: 144 BPM
EKG ATRIAL RATE: 87 BPM
EKG DIAGNOSIS: NORMAL
EKG DIAGNOSIS: NORMAL
EKG Q-T INTERVAL: 298 MS
EKG Q-T INTERVAL: 386 MS
EKG QRS DURATION: 80 MS
EKG QRS DURATION: 88 MS
EKG QTC CALCULATION (BAZETT): 461 MS
EKG QTC CALCULATION (BAZETT): 480 MS
EKG R AXIS: 66 DEGREES
EKG R AXIS: 77 DEGREES
EKG T AXIS: -1 DEGREES
EKG T AXIS: -68 DEGREES
EKG VENTRICULAR RATE: 156 BPM
EKG VENTRICULAR RATE: 86 BPM
GLOBULIN SER CALC-MCNC: 3.4 G/DL (ref 2–4)
GLUCOSE SERPL-MCNC: 161 MG/DL (ref 65–100)
POTASSIUM SERPL-SCNC: 4.2 MMOL/L (ref 3.5–5.1)
PROT SERPL-MCNC: 6.8 G/DL (ref 6.4–8.2)
SODIUM SERPL-SCNC: 130 MMOL/L (ref 136–145)
UFH PPP CHRO-ACNC: 0.16 IU/ML
UFH PPP CHRO-ACNC: 0.24 IU/ML
UFH PPP CHRO-ACNC: 0.33 IU/ML

## 2024-08-08 PROCEDURE — 2500000003 HC RX 250 WO HCPCS: Performed by: EMERGENCY MEDICINE

## 2024-08-08 PROCEDURE — 94640 AIRWAY INHALATION TREATMENT: CPT

## 2024-08-08 PROCEDURE — 6360000002 HC RX W HCPCS

## 2024-08-08 PROCEDURE — 2060000000 HC ICU INTERMEDIATE R&B

## 2024-08-08 PROCEDURE — 80053 COMPREHEN METABOLIC PANEL: CPT

## 2024-08-08 PROCEDURE — 6370000000 HC RX 637 (ALT 250 FOR IP): Performed by: NURSE PRACTITIONER

## 2024-08-08 PROCEDURE — 85520 HEPARIN ASSAY: CPT

## 2024-08-08 PROCEDURE — 94761 N-INVAS EAR/PLS OXIMETRY MLT: CPT

## 2024-08-08 PROCEDURE — 94762 N-INVAS EAR/PLS OXIMTRY CONT: CPT

## 2024-08-08 PROCEDURE — 83880 ASSAY OF NATRIURETIC PEPTIDE: CPT

## 2024-08-08 PROCEDURE — 6360000002 HC RX W HCPCS: Performed by: INTERNAL MEDICINE

## 2024-08-08 PROCEDURE — 36415 COLL VENOUS BLD VENIPUNCTURE: CPT

## 2024-08-08 RX ORDER — SODIUM BICARBONATE 650 MG/1
650 TABLET ORAL 2 TIMES DAILY
Status: DISCONTINUED | OUTPATIENT
Start: 2024-08-08 | End: 2024-08-11

## 2024-08-08 RX ORDER — ALBUTEROL SULFATE 90 UG/1
2 AEROSOL, METERED RESPIRATORY (INHALATION)
Status: ACTIVE | OUTPATIENT
Start: 2024-08-08 | End: 2024-08-08

## 2024-08-08 RX ORDER — FUROSEMIDE 10 MG/ML
40 INJECTION INTRAMUSCULAR; INTRAVENOUS ONCE
Status: COMPLETED | OUTPATIENT
Start: 2024-08-08 | End: 2024-08-08

## 2024-08-08 RX ADMIN — FUROSEMIDE 40 MG: 10 INJECTION, SOLUTION INTRAMUSCULAR; INTRAVENOUS at 15:16

## 2024-08-08 RX ADMIN — Medication 2 PUFF: at 21:08

## 2024-08-08 RX ADMIN — LISINOPRIL 5 MG: 5 TABLET ORAL at 08:38

## 2024-08-08 RX ADMIN — AMIODARONE HYDROCHLORIDE 0.5 MG/MIN: 1.8 INJECTION, SOLUTION INTRAVENOUS at 08:39

## 2024-08-08 RX ADMIN — SODIUM BICARBONATE 650 MG: 650 TABLET ORAL at 08:42

## 2024-08-08 RX ADMIN — Medication 2 PUFF: at 09:45

## 2024-08-08 RX ADMIN — HEPARIN SODIUM 1800 UNITS: 1000 INJECTION INTRAVENOUS; SUBCUTANEOUS at 22:08

## 2024-08-08 RX ADMIN — METOPROLOL TARTRATE 25 MG: 25 TABLET, FILM COATED ORAL at 20:56

## 2024-08-08 RX ADMIN — SODIUM BICARBONATE 650 MG: 650 TABLET ORAL at 20:56

## 2024-08-08 RX ADMIN — AMIODARONE HYDROCHLORIDE 0.5 MG/MIN: 1.8 INJECTION, SOLUTION INTRAVENOUS at 21:05

## 2024-08-08 RX ADMIN — METOPROLOL TARTRATE 25 MG: 25 TABLET, FILM COATED ORAL at 08:38

## 2024-08-08 RX ADMIN — HEPARIN SODIUM 1800 UNITS: 1000 INJECTION INTRAVENOUS; SUBCUTANEOUS at 05:33

## 2024-08-08 ASSESSMENT — PAIN SCALES - GENERAL: PAINLEVEL_OUTOF10: 0

## 2024-08-08 NOTE — PLAN OF CARE
Problem: Pain  Goal: Verbalizes/displays adequate comfort level or baseline comfort level  8/7/2024 2020 by Addis Malloy RN  Outcome: Progressing  8/7/2024 1816 by Marlys Rivera RN  Outcome: Progressing     Problem: Discharge Planning  Goal: Discharge to home or other facility with appropriate resources  8/7/2024 2020 by Addis Malloy RN  Outcome: Progressing  8/7/2024 1816 by Marlys Rivera RN  Outcome: Progressing  Flowsheets (Taken 8/7/2024 1730)  Discharge to home or other facility with appropriate resources:   Identify barriers to discharge with patient and caregiver   Arrange for needed discharge resources and transportation as appropriate   Identify discharge learning needs (meds, wound care, etc)   Refer to discharge planning if patient needs post-hospital services based on physician order or complex needs related to functional status, cognitive ability or social support system

## 2024-08-08 NOTE — CARE COORDINATION
Chart reviewed, DCP remains for patient to d/c from CM to home with friend once medically stable, no CM needs at this time.    CM will continue to follow and monitor for needs.

## 2024-08-08 NOTE — ACP (ADVANCE CARE PLANNING)
Advance Care Planning     Advance Care Planning Inpatient Note  Spiritual Care Department    Today's Date: 8/8/2024  Unit: SSR 4 WEST CARDIAC TELEMETRY    Received request from patient.  Upon review of chart and communication with care team, patient's decision making abilities are not in question.. Patient was/were present in the room during visit.    Goals of ACP Conversation:  Discuss advance care planning documents    Health Care Decision Makers:       Primary Decision Maker: Harini Longoria L - Child - [phone]  Summary:  Completed New Documents    Advance Care Planning Documents (Patient Wishes):  Healthcare Power of /Advance Directive Appointment of Health Care Agent  Living Will/Advance Directive     Assessment:   responded to spiritual consult for pt requesting to complete an Advance Medical Directive (AMD). AMD completed as requested.    Interventions:  Provided education on documents for clarity and greater understanding  Assisted in the completion of documents according to patient's wishes at this time    Care Preferences Communicated:   No    Outcomes/Plan:  ACP Discussion: Completed  New advance directive completed.  Returned original document(s) to patient, as well as copies for distribution to appointed agents  Copy of advance directive given to staff to scan into medical record.    Electronically signed by Chaplain Kiara on 8/8/2024 at 12:12 PM

## 2024-08-08 NOTE — PLAN OF CARE
Patient alert and oriented, remains on Amiodarone and Heparin drips,  Started on sodium bicarb oral for low sodium. Patient understands course of care, and need to remain in hospital due to cardiac activity, heparin assay pending. Patient able to ambulate and make needs known. Call light and table within reach. Patient eating and voiding with no discomfort. Will continue to monitor.  Problem: Discharge Planning  Goal: Discharge to home or other facility with appropriate resources  Outcome: Progressing  Flowsheets (Taken 8/8/2024 1036)  Discharge to home or other facility with appropriate resources: Identify barriers to discharge with patient and caregiver     Problem: Pain  Goal: Verbalizes/displays adequate comfort level or baseline comfort level  Outcome: Progressing

## 2024-08-08 NOTE — CONSULTS
extremity edema  Skin:  Dry, warm  Psych:  Normal affect      Vitals:    08/07/24 1445   BP: (!) 142/102   Pulse: (!) 128   Resp: 21   Temp:    SpO2: 93%       Recent labs results and imaging reviewed.     Discussed case with Dr. Taylor and our impression and recommendations are as follows:  Afib RVR: Continue amiodarone drip. Heparin drip for now. Add metoprolol tartrate 25mg BID. Continue telemetry monitoring. Maintain electrolytes. TSH normal. Check echo. Consider SRINIVASA/DCCV if does not convert.   Hypertension: blood pressure elevated. Monitor with rate control efforts.  COPD: plan per primary team.    Thank you for involving us in the care of this patient.  Please do not hesitate to call if additional questions arise.    MIRANDA Vu  8/7/2024   
08/07/24  1841   WBC 11.4* 11.6*  --    HGB 16.3 14.3  --     340  --    INR 1.0  --  1.2*   APTT  --   --  >153.0*     Recent Labs     08/07/24  1114 08/07/24  1450 08/08/24 0214   * 132* 130*   K 4.7 3.9 4.2   CL 97 97 96*   CO2 29 29 29   GLUCOSE 122* 140* 161*   BUN 8 9 14   CREATININE 1.11 1.12 1.14   CALCIUM 9.8 9.3 9.6   MG 2.4 2.0  --    BILITOT 1.1* 0.9 0.5   AST 27 22 21   ALT 32 31 34     No results for input(s): \"PH\", \"PCO2\", \"PO2\", \"HCO3\", \"FIO2\" in the last 72 hours.  Recent Labs     08/08/24 0214   PROBNP 1,482*     Lab Results   Component Value Date/Time    PROBNP 1,482 08/08/2024 02:14 AM      Lab Results   Component Value Date/Time    TSH 31.80 08/07/2024 03:37 PM       Results       Procedure Component Value Units Date/Time    Blood Culture 1 [[id number]] Collected: 08/07/24 1131    Order Status: Completed Specimen: Blood Updated: 08/08/24 1422     Special Requests --        No Special Requests  Left  Antecubital       Culture No growth 1 day       Blood Culture 2 [[id number]] Collected: 08/07/24 1131    Order Status: Completed Specimen: Blood Updated: 08/08/24 1422     Special Requests No Special Requests        Culture No growth 1 day                Imaging:  Echo (TTE) complete (PRN contrast/bubble/strain/3D)    Result Date: 8/7/2024    Left Ventricle: Severely reduced left ventricular systolic function with a visually estimated EF of 25 - 30%.EF difficult to calculate due to AF. Left ventricle size is normal. Normal wall thickness. Global hypokinesis present. Grade II diastolic dysfunction with increased LAP.   Tricuspid Valve: Mild regurgitation. Est RA pressure is 8 mmHg. The estimated RVSP is 26 mmHg.   Aorta: Dilated aortic root. Ao root diameter is 4.1 cm. Dilated ascending aorta. Ao ascending diameter is 4.1 cm.   Image quality is good.     XR CHEST PORTABLE    Result Date: 8/7/2024  EXAM:  XR CHEST PORTABLE INDICATION: Shortness of breath COMPARISON: none TECHNIQUE: 1127

## 2024-08-09 ENCOUNTER — HOSPITAL ENCOUNTER (OUTPATIENT)
Facility: HOSPITAL | Age: 64
DRG: 308 | End: 2024-08-09
Attending: INTERNAL MEDICINE
Payer: MEDICARE

## 2024-08-09 LAB
ANION GAP SERPL CALC-SCNC: 7 MMOL/L (ref 5–15)
BASOPHILS # BLD: 0 K/UL (ref 0–0.1)
BASOPHILS NFR BLD: 0 % (ref 0–1)
BUN SERPL-MCNC: 21 MG/DL (ref 6–20)
BUN/CREAT SERPL: 20 (ref 12–20)
CA-I BLD-MCNC: 9 MG/DL (ref 8.5–10.1)
CHLORIDE SERPL-SCNC: 93 MMOL/L (ref 97–108)
CO2 SERPL-SCNC: 30 MMOL/L (ref 21–32)
CREAT SERPL-MCNC: 1.04 MG/DL (ref 0.7–1.3)
DIFFERENTIAL METHOD BLD: ABNORMAL
EKG ATRIAL RATE: 65 BPM
EKG DIAGNOSIS: NORMAL
EKG P AXIS: 65 DEGREES
EKG P-R INTERVAL: 114 MS
EKG Q-T INTERVAL: 476 MS
EKG QRS DURATION: 92 MS
EKG QTC CALCULATION (BAZETT): 495 MS
EKG R AXIS: 53 DEGREES
EKG T AXIS: 32 DEGREES
EKG VENTRICULAR RATE: 65 BPM
EOSINOPHIL # BLD: 0.1 K/UL (ref 0–0.4)
EOSINOPHIL NFR BLD: 0 % (ref 0–7)
ERYTHROCYTE [DISTWIDTH] IN BLOOD BY AUTOMATED COUNT: 11.6 % (ref 11.5–14.5)
ERYTHROCYTE [DISTWIDTH] IN BLOOD BY AUTOMATED COUNT: 11.8 % (ref 11.5–14.5)
GLUCOSE SERPL-MCNC: 111 MG/DL (ref 65–100)
HCT VFR BLD AUTO: 36.1 % (ref 36.6–50.3)
HCT VFR BLD AUTO: 40 % (ref 36.6–50.3)
HGB BLD-MCNC: 13 G/DL (ref 12.1–17)
HGB BLD-MCNC: 14.2 G/DL (ref 12.1–17)
IMM GRANULOCYTES # BLD AUTO: 0.1 K/UL (ref 0–0.04)
IMM GRANULOCYTES NFR BLD AUTO: 1 % (ref 0–0.5)
INR PPP: 1 (ref 0.9–1.1)
LYMPHOCYTES # BLD: 2.1 K/UL (ref 0.8–3.5)
LYMPHOCYTES NFR BLD: 10 % (ref 12–49)
MCH RBC QN AUTO: 31.9 PG (ref 26–34)
MCH RBC QN AUTO: 32 PG (ref 26–34)
MCHC RBC AUTO-ENTMCNC: 35.5 G/DL (ref 30–36.5)
MCHC RBC AUTO-ENTMCNC: 36 G/DL (ref 30–36.5)
MCV RBC AUTO: 88.5 FL (ref 80–99)
MCV RBC AUTO: 90.1 FL (ref 80–99)
MONOCYTES # BLD: 0.9 K/UL (ref 0–1)
MONOCYTES NFR BLD: 4 % (ref 5–13)
NEUTS SEG # BLD: 18.3 K/UL (ref 1.8–8)
NEUTS SEG NFR BLD: 85 % (ref 32–75)
NRBC # BLD: 0 K/UL (ref 0–0.01)
NRBC # BLD: 0 K/UL (ref 0–0.01)
NRBC BLD-RTO: 0 PER 100 WBC
NRBC BLD-RTO: 0 PER 100 WBC
PLATELET # BLD AUTO: 338 K/UL (ref 150–400)
PLATELET # BLD AUTO: 382 K/UL (ref 150–400)
PMV BLD AUTO: 8.7 FL (ref 8.9–12.9)
PMV BLD AUTO: 8.9 FL (ref 8.9–12.9)
POTASSIUM SERPL-SCNC: 3.5 MMOL/L (ref 3.5–5.1)
PROTHROMBIN TIME: 13.4 SEC (ref 11.9–14.6)
RBC # BLD AUTO: 4.08 M/UL (ref 4.1–5.7)
RBC # BLD AUTO: 4.44 M/UL (ref 4.1–5.7)
SODIUM SERPL-SCNC: 130 MMOL/L (ref 136–145)
UFH PPP CHRO-ACNC: 0.28 IU/ML
UFH PPP CHRO-ACNC: 0.37 IU/ML
UFH PPP CHRO-ACNC: 0.48 IU/ML
WBC # BLD AUTO: 21.5 K/UL (ref 4.1–11.1)
WBC # BLD AUTO: 22.1 K/UL (ref 4.1–11.1)

## 2024-08-09 PROCEDURE — 6370000000 HC RX 637 (ALT 250 FOR IP): Performed by: NURSE PRACTITIONER

## 2024-08-09 PROCEDURE — 94640 AIRWAY INHALATION TREATMENT: CPT

## 2024-08-09 PROCEDURE — 85520 HEPARIN ASSAY: CPT

## 2024-08-09 PROCEDURE — 93005 ELECTROCARDIOGRAM TRACING: CPT | Performed by: INTERNAL MEDICINE

## 2024-08-09 PROCEDURE — 2060000000 HC ICU INTERMEDIATE R&B

## 2024-08-09 PROCEDURE — 85025 COMPLETE CBC W/AUTO DIFF WBC: CPT

## 2024-08-09 PROCEDURE — 85610 PROTHROMBIN TIME: CPT

## 2024-08-09 PROCEDURE — 94761 N-INVAS EAR/PLS OXIMETRY MLT: CPT

## 2024-08-09 PROCEDURE — 80048 BASIC METABOLIC PNL TOTAL CA: CPT

## 2024-08-09 PROCEDURE — 6360000002 HC RX W HCPCS: Performed by: NURSE PRACTITIONER

## 2024-08-09 PROCEDURE — 2580000003 HC RX 258: Performed by: NURSE PRACTITIONER

## 2024-08-09 PROCEDURE — 36415 COLL VENOUS BLD VENIPUNCTURE: CPT

## 2024-08-09 PROCEDURE — 6370000000 HC RX 637 (ALT 250 FOR IP): Performed by: INTERNAL MEDICINE

## 2024-08-09 PROCEDURE — 6360000002 HC RX W HCPCS: Performed by: INTERNAL MEDICINE

## 2024-08-09 PROCEDURE — 85027 COMPLETE CBC AUTOMATED: CPT

## 2024-08-09 RX ORDER — HYDRALAZINE HYDROCHLORIDE 20 MG/ML
10 INJECTION INTRAMUSCULAR; INTRAVENOUS EVERY 4 HOURS PRN
Status: DISCONTINUED | OUTPATIENT
Start: 2024-08-09 | End: 2024-08-14 | Stop reason: HOSPADM

## 2024-08-09 RX ORDER — LISINOPRIL 10 MG/1
10 TABLET ORAL DAILY
Status: DISCONTINUED | OUTPATIENT
Start: 2024-08-10 | End: 2024-08-11

## 2024-08-09 RX ORDER — SODIUM CHLORIDE 9 MG/ML
INJECTION, SOLUTION INTRAVENOUS CONTINUOUS
Status: DISCONTINUED | OUTPATIENT
Start: 2024-08-09 | End: 2024-08-12

## 2024-08-09 RX ORDER — AMIODARONE HYDROCHLORIDE 200 MG/1
200 TABLET ORAL 2 TIMES DAILY
Status: DISCONTINUED | OUTPATIENT
Start: 2024-08-09 | End: 2024-08-11

## 2024-08-09 RX ORDER — AZITHROMYCIN 500 MG/1
500 TABLET, FILM COATED ORAL DAILY
Status: DISCONTINUED | OUTPATIENT
Start: 2024-08-09 | End: 2024-08-14 | Stop reason: HOSPADM

## 2024-08-09 RX ORDER — LISINOPRIL 5 MG/1
5 TABLET ORAL ONCE
Status: COMPLETED | OUTPATIENT
Start: 2024-08-09 | End: 2024-08-09

## 2024-08-09 RX ORDER — WARFARIN SODIUM 5 MG/1
5 TABLET ORAL ONCE
Status: COMPLETED | OUTPATIENT
Start: 2024-08-09 | End: 2024-08-09

## 2024-08-09 RX ADMIN — AMIODARONE HYDROCHLORIDE 200 MG: 200 TABLET ORAL at 21:12

## 2024-08-09 RX ADMIN — WARFARIN SODIUM 5 MG: 5 TABLET ORAL at 17:00

## 2024-08-09 RX ADMIN — LISINOPRIL 5 MG: 5 TABLET ORAL at 07:48

## 2024-08-09 RX ADMIN — METOPROLOL TARTRATE 25 MG: 25 TABLET, FILM COATED ORAL at 07:48

## 2024-08-09 RX ADMIN — LISINOPRIL 5 MG: 5 TABLET ORAL at 17:00

## 2024-08-09 RX ADMIN — HEPARIN SODIUM 18 UNITS/KG/HR: 10000 INJECTION, SOLUTION INTRAVENOUS at 12:54

## 2024-08-09 RX ADMIN — SODIUM CHLORIDE: 9 INJECTION, SOLUTION INTRAVENOUS at 07:39

## 2024-08-09 RX ADMIN — HEPARIN SODIUM 16 UNITS/KG/HR: 10000 INJECTION, SOLUTION INTRAVENOUS at 00:08

## 2024-08-09 RX ADMIN — SODIUM BICARBONATE 650 MG: 650 TABLET ORAL at 21:12

## 2024-08-09 RX ADMIN — AMIODARONE HYDROCHLORIDE 200 MG: 200 TABLET ORAL at 07:47

## 2024-08-09 RX ADMIN — SODIUM CHLORIDE: 9 INJECTION, SOLUTION INTRAVENOUS at 21:15

## 2024-08-09 RX ADMIN — AZITHROMYCIN DIHYDRATE 500 MG: 500 TABLET ORAL at 17:47

## 2024-08-09 RX ADMIN — Medication 2 PUFF: at 07:58

## 2024-08-09 RX ADMIN — SODIUM BICARBONATE 650 MG: 650 TABLET ORAL at 07:47

## 2024-08-09 RX ADMIN — SODIUM CHLORIDE: 9 INJECTION, SOLUTION INTRAVENOUS at 08:27

## 2024-08-09 RX ADMIN — HEPARIN SODIUM 1800 UNITS: 1000 INJECTION INTRAVENOUS; SUBCUTANEOUS at 12:53

## 2024-08-09 RX ADMIN — HYDRALAZINE HYDROCHLORIDE 10 MG: 20 INJECTION, SOLUTION INTRAMUSCULAR; INTRAVENOUS at 20:02

## 2024-08-09 RX ADMIN — Medication 2 PUFF: at 20:31

## 2024-08-09 RX ADMIN — METOPROLOL TARTRATE 25 MG: 25 TABLET, FILM COATED ORAL at 21:11

## 2024-08-09 NOTE — CARE COORDINATION
Patient needs Nocturnal Home Oxygen.  CM met with patient at bedside to discuss.  Patient is agreeable to CM setting up equipment in the home.  [place] of choice for no preference in company.  CM sent referral to [facility] for processing.

## 2024-08-09 NOTE — PLAN OF CARE
Problem: Discharge Planning  Goal: Discharge to home or other facility with appropriate resources  8/8/2024 2008 by Addis Malloy, RN  Outcome: Progressing  8/8/2024 1214 by Tracey Henning RN  Outcome: Progressing  Flowsheets (Taken 8/8/2024 1036)  Discharge to home or other facility with appropriate resources: Identify barriers to discharge with patient and caregiver     Problem: Pain  Goal: Verbalizes/displays adequate comfort level or baseline comfort level  8/8/2024 2008 by Addis Malloy, RN  Outcome: Progressing  8/8/2024 1214 by Tracye Henning RN  Outcome: Progressing

## 2024-08-09 NOTE — PLAN OF CARE
Problem: Discharge Planning  Goal: Discharge to home or other facility with appropriate resources  8/9/2024 0839 by Christie Villatoro, RN  Outcome: Progressing  8/8/2024 2008 by Addis Malloy RN  Outcome: Progressing     Problem: Pain  Goal: Verbalizes/displays adequate comfort level or baseline comfort level  8/9/2024 0839 by Christie Villatoro, RN  Outcome: Progressing  8/8/2024 2008 by Addis Malloy, RN  Outcome: Progressing

## 2024-08-09 NOTE — CARE COORDINATION
Patient only has medicare part A.  DME is not covered with his insurance.  CM checked with [employer] on private pay price and CM was informed $161,  CM checked with [email or web address] [employer] for private pay cost and CM was informed of $150.  CM notified patient, he would like to go for the cheapest company.  CM has asked [facility] which number the patient should call to get this set up, awaiting response. CM called [facility] at [phone], they informed CM that patient can call the main number at [phone] to set up nocturnal oxygen at home.  CM gave patient this information.

## 2024-08-09 NOTE — NURSE NAVIGATOR
Heart Failure Nurse Navigator: Heart Failure Education    **NEW ONSET HF    RN performs hand hygiene. RN Introduces herself to the patient and informs the patient of the reasoning for the visit.    Patient Verbalizes Understanding for visit, is accepting of discussion    Persons present for Education: Patient    Time Spent: At least 60minutes    Method of teaching:  Teach-back, demonstration, verbal, visual    Education Packets Given: Heart Failure symptom management calendar/tracker, AHA HF education booklet, HF magnet    -Confirmation of working scales & that the patient can read numbers  -Confirmation of support to assist with daily weights if patient unable to perform independently.     RN-NN provided education on Daily weights to include same time daily, on same scale, and documenting weights on calendar. RN-NN provided education trigger management/ signs and symptoms of Heart Failure. Patient is advised on “Yellow Days” to call MD office and on “Red Days” to come to the ER or call 911.   RN provides Nutritional education that includes how to calculate and restrict sodium and fluid intake. Encouraged fresh vegetable choice over canned/processed goods. Demonstrated how to log meals/intake. RN discusses lifestyle changes including cessation of smoking and increasing activity level.   In addition, RN discusses the importance of keeping/scheduling appointments and adherence to guideline directed medical therapies.      Patient was able to teach back to the RN-NN the above information.  Patient will need reinforcement of education provided.  Patient advised about the HF helper Ty.

## 2024-08-10 LAB
INR PPP: 1 (ref 0.9–1.1)
PROTHROMBIN TIME: 13.6 SEC (ref 11.9–14.6)
UFH PPP CHRO-ACNC: 0.31 IU/ML

## 2024-08-10 PROCEDURE — 6370000000 HC RX 637 (ALT 250 FOR IP): Performed by: NURSE PRACTITIONER

## 2024-08-10 PROCEDURE — 6360000002 HC RX W HCPCS: Performed by: NURSE PRACTITIONER

## 2024-08-10 PROCEDURE — 6370000000 HC RX 637 (ALT 250 FOR IP)

## 2024-08-10 PROCEDURE — 85520 HEPARIN ASSAY: CPT

## 2024-08-10 PROCEDURE — 6370000000 HC RX 637 (ALT 250 FOR IP): Performed by: INTERNAL MEDICINE

## 2024-08-10 PROCEDURE — 94640 AIRWAY INHALATION TREATMENT: CPT

## 2024-08-10 PROCEDURE — 85610 PROTHROMBIN TIME: CPT

## 2024-08-10 PROCEDURE — 36415 COLL VENOUS BLD VENIPUNCTURE: CPT

## 2024-08-10 PROCEDURE — 2060000000 HC ICU INTERMEDIATE R&B

## 2024-08-10 PROCEDURE — 6360000002 HC RX W HCPCS: Performed by: INTERNAL MEDICINE

## 2024-08-10 PROCEDURE — 6370000000 HC RX 637 (ALT 250 FOR IP): Performed by: STUDENT IN AN ORGANIZED HEALTH CARE EDUCATION/TRAINING PROGRAM

## 2024-08-10 RX ORDER — WARFARIN SODIUM 5 MG/1
5 TABLET ORAL ONCE
Status: COMPLETED | OUTPATIENT
Start: 2024-08-10 | End: 2024-08-10

## 2024-08-10 RX ADMIN — HYDRALAZINE HYDROCHLORIDE 10 MG: 20 INJECTION, SOLUTION INTRAMUSCULAR; INTRAVENOUS at 03:42

## 2024-08-10 RX ADMIN — Medication 2 PUFF: at 20:57

## 2024-08-10 RX ADMIN — ONDANSETRON 4 MG: 2 INJECTION INTRAMUSCULAR; INTRAVENOUS at 06:07

## 2024-08-10 RX ADMIN — AMIODARONE HYDROCHLORIDE 200 MG: 200 TABLET ORAL at 21:53

## 2024-08-10 RX ADMIN — ONDANSETRON 4 MG: 2 INJECTION INTRAMUSCULAR; INTRAVENOUS at 11:18

## 2024-08-10 RX ADMIN — HEPARIN SODIUM 18 UNITS/KG/HR: 10000 INJECTION, SOLUTION INTRAVENOUS at 01:43

## 2024-08-10 RX ADMIN — AZITHROMYCIN DIHYDRATE 500 MG: 500 TABLET ORAL at 17:34

## 2024-08-10 RX ADMIN — LISINOPRIL 10 MG: 10 TABLET ORAL at 09:03

## 2024-08-10 RX ADMIN — AMIODARONE HYDROCHLORIDE 200 MG: 200 TABLET ORAL at 09:03

## 2024-08-10 RX ADMIN — SODIUM BICARBONATE 650 MG: 650 TABLET ORAL at 09:03

## 2024-08-10 RX ADMIN — WARFARIN SODIUM 5 MG: 5 TABLET ORAL at 17:33

## 2024-08-10 RX ADMIN — Medication 2 PUFF: at 08:41

## 2024-08-10 RX ADMIN — SODIUM BICARBONATE 650 MG: 650 TABLET ORAL at 21:54

## 2024-08-10 RX ADMIN — METOPROLOL TARTRATE 25 MG: 25 TABLET, FILM COATED ORAL at 09:03

## 2024-08-10 RX ADMIN — METOPROLOL TARTRATE 25 MG: 25 TABLET, FILM COATED ORAL at 21:54

## 2024-08-10 ASSESSMENT — PAIN SCALES - GENERAL: PAINLEVEL_OUTOF10: 0

## 2024-08-11 LAB
BASOPHILS # BLD: 0 K/UL (ref 0–0.1)
BASOPHILS NFR BLD: 0 % (ref 0–1)
DIFFERENTIAL METHOD BLD: ABNORMAL
EKG ATRIAL RATE: 66 BPM
EKG DIAGNOSIS: NORMAL
EKG P AXIS: 78 DEGREES
EKG P-R INTERVAL: 136 MS
EKG Q-T INTERVAL: 476 MS
EKG QRS DURATION: 96 MS
EKG QTC CALCULATION (BAZETT): 499 MS
EKG R AXIS: 55 DEGREES
EKG T AXIS: -24 DEGREES
EKG VENTRICULAR RATE: 66 BPM
EOSINOPHIL # BLD: 0.1 K/UL (ref 0–0.4)
EOSINOPHIL NFR BLD: 1 % (ref 0–7)
ERYTHROCYTE [DISTWIDTH] IN BLOOD BY AUTOMATED COUNT: 11.4 % (ref 11.5–14.5)
HCT VFR BLD AUTO: 35.3 % (ref 36.6–50.3)
HGB BLD-MCNC: 12.4 G/DL (ref 12.1–17)
IMM GRANULOCYTES # BLD AUTO: 0 K/UL (ref 0–0.04)
IMM GRANULOCYTES NFR BLD AUTO: 0 % (ref 0–0.5)
INR PPP: 1.4 (ref 0.9–1.1)
LYMPHOCYTES # BLD: 1.9 K/UL (ref 0.8–3.5)
LYMPHOCYTES NFR BLD: 16 % (ref 12–49)
MCH RBC QN AUTO: 31.3 PG (ref 26–34)
MCHC RBC AUTO-ENTMCNC: 35.1 G/DL (ref 30–36.5)
MCV RBC AUTO: 89.1 FL (ref 80–99)
MONOCYTES # BLD: 0.9 K/UL (ref 0–1)
MONOCYTES NFR BLD: 8 % (ref 5–13)
NEUTS SEG # BLD: 9.1 K/UL (ref 1.8–8)
NEUTS SEG NFR BLD: 75 % (ref 32–75)
NRBC # BLD: 0 K/UL (ref 0–0.01)
NRBC BLD-RTO: 0 PER 100 WBC
PLATELET # BLD AUTO: 342 K/UL (ref 150–400)
PMV BLD AUTO: 8.7 FL (ref 8.9–12.9)
PROTHROMBIN TIME: 17.8 SEC (ref 11.9–14.6)
RBC # BLD AUTO: 3.96 M/UL (ref 4.1–5.7)
UFH PPP CHRO-ACNC: 0.21 IU/ML
WBC # BLD AUTO: 12.1 K/UL (ref 4.1–11.1)

## 2024-08-11 PROCEDURE — 2060000000 HC ICU INTERMEDIATE R&B

## 2024-08-11 PROCEDURE — 6370000000 HC RX 637 (ALT 250 FOR IP): Performed by: NURSE PRACTITIONER

## 2024-08-11 PROCEDURE — 6360000002 HC RX W HCPCS: Performed by: INTERNAL MEDICINE

## 2024-08-11 PROCEDURE — 94640 AIRWAY INHALATION TREATMENT: CPT

## 2024-08-11 PROCEDURE — 93005 ELECTROCARDIOGRAM TRACING: CPT | Performed by: NURSE PRACTITIONER

## 2024-08-11 PROCEDURE — 85520 HEPARIN ASSAY: CPT

## 2024-08-11 PROCEDURE — 85610 PROTHROMBIN TIME: CPT

## 2024-08-11 PROCEDURE — 6360000002 HC RX W HCPCS: Performed by: NURSE PRACTITIONER

## 2024-08-11 PROCEDURE — 6370000000 HC RX 637 (ALT 250 FOR IP)

## 2024-08-11 PROCEDURE — 94761 N-INVAS EAR/PLS OXIMETRY MLT: CPT

## 2024-08-11 PROCEDURE — 6370000000 HC RX 637 (ALT 250 FOR IP): Performed by: STUDENT IN AN ORGANIZED HEALTH CARE EDUCATION/TRAINING PROGRAM

## 2024-08-11 PROCEDURE — 6370000000 HC RX 637 (ALT 250 FOR IP): Performed by: INTERNAL MEDICINE

## 2024-08-11 PROCEDURE — 85025 COMPLETE CBC W/AUTO DIFF WBC: CPT

## 2024-08-11 RX ORDER — LISINOPRIL 20 MG/1
20 TABLET ORAL DAILY
Status: DISCONTINUED | OUTPATIENT
Start: 2024-08-12 | End: 2024-08-14 | Stop reason: HOSPADM

## 2024-08-11 RX ORDER — WARFARIN SODIUM 5 MG/1
5 TABLET ORAL ONCE
Status: COMPLETED | OUTPATIENT
Start: 2024-08-11 | End: 2024-08-11

## 2024-08-11 RX ORDER — POTASSIUM CHLORIDE 20 MEQ/1
40 TABLET, EXTENDED RELEASE ORAL ONCE
Status: COMPLETED | OUTPATIENT
Start: 2024-08-11 | End: 2024-08-11

## 2024-08-11 RX ORDER — PROCHLORPERAZINE EDISYLATE 5 MG/ML
10 INJECTION INTRAMUSCULAR; INTRAVENOUS EVERY 6 HOURS PRN
Status: DISCONTINUED | OUTPATIENT
Start: 2024-08-11 | End: 2024-08-14 | Stop reason: HOSPADM

## 2024-08-11 RX ADMIN — POTASSIUM CHLORIDE 40 MEQ: 1500 TABLET, EXTENDED RELEASE ORAL at 11:10

## 2024-08-11 RX ADMIN — LISINOPRIL 10 MG: 10 TABLET ORAL at 08:34

## 2024-08-11 RX ADMIN — AZITHROMYCIN DIHYDRATE 500 MG: 500 TABLET ORAL at 17:26

## 2024-08-11 RX ADMIN — Medication 2 PUFF: at 22:03

## 2024-08-11 RX ADMIN — METOPROLOL TARTRATE 25 MG: 25 TABLET, FILM COATED ORAL at 08:34

## 2024-08-11 RX ADMIN — PROCHLORPERAZINE EDISYLATE 10 MG: 5 INJECTION INTRAMUSCULAR; INTRAVENOUS at 15:31

## 2024-08-11 RX ADMIN — SODIUM BICARBONATE 650 MG: 650 TABLET ORAL at 08:34

## 2024-08-11 RX ADMIN — HEPARIN SODIUM 18 UNITS/KG/HR: 10000 INJECTION, SOLUTION INTRAVENOUS at 05:16

## 2024-08-11 RX ADMIN — Medication 2 PUFF: at 08:38

## 2024-08-11 RX ADMIN — AMIODARONE HYDROCHLORIDE 200 MG: 200 TABLET ORAL at 08:34

## 2024-08-11 RX ADMIN — METOPROLOL TARTRATE 25 MG: 25 TABLET, FILM COATED ORAL at 21:12

## 2024-08-11 RX ADMIN — WARFARIN SODIUM 5 MG: 5 TABLET ORAL at 17:26

## 2024-08-11 ASSESSMENT — PAIN SCALES - GENERAL: PAINLEVEL_OUTOF10: 0

## 2024-08-11 NOTE — PLAN OF CARE
Problem: Discharge Planning  Goal: Discharge to home or other facility with appropriate resources  8/11/2024 0839 by Britt Klein RN  Outcome: Progressing  Flowsheets (Taken 8/11/2024 0743)  Discharge to home or other facility with appropriate resources:   Identify barriers to discharge with patient and caregiver   Arrange for needed discharge resources and transportation as appropriate  8/11/2024 0251 by Zohreh Alvarenga RN  Outcome: Progressing     Problem: Pain  Goal: Verbalizes/displays adequate comfort level or baseline comfort level  8/11/2024 0839 by Britt Klein RN  Outcome: Progressing  Flowsheets (Taken 8/11/2024 0743)  Verbalizes/displays adequate comfort level or baseline comfort level:   Encourage patient to monitor pain and request assistance   Assess pain using appropriate pain scale  8/11/2024 0251 by Zohreh Alvarenga RN  Outcome: Progressing     Problem: Safety - Adult  Goal: Free from fall injury  Outcome: Progressing

## 2024-08-12 ENCOUNTER — APPOINTMENT (OUTPATIENT)
Facility: HOSPITAL | Age: 64
DRG: 308 | End: 2024-08-12
Payer: MEDICARE

## 2024-08-12 LAB
ALBUMIN SERPL-MCNC: 3.2 G/DL (ref 3.5–5)
ALBUMIN/GLOB SERPL: 1.1 (ref 1.1–2.2)
ALP SERPL-CCNC: 92 U/L (ref 45–117)
ALT SERPL-CCNC: 66 U/L (ref 12–78)
ANION GAP SERPL CALC-SCNC: 10 MMOL/L (ref 5–15)
ANION GAP SERPL CALC-SCNC: 7 MMOL/L (ref 5–15)
AST SERPL W P-5'-P-CCNC: 45 U/L (ref 15–37)
BASOPHILS # BLD: 0 K/UL (ref 0–0.1)
BASOPHILS NFR BLD: 0 % (ref 0–1)
BILIRUB SERPL-MCNC: 1 MG/DL (ref 0.2–1)
BNP SERPL-MCNC: 885 PG/ML
BUN SERPL-MCNC: 14 MG/DL (ref 6–20)
BUN SERPL-MCNC: 15 MG/DL (ref 6–20)
BUN/CREAT SERPL: 11 (ref 12–20)
BUN/CREAT SERPL: 13 (ref 12–20)
CA-I BLD-MCNC: 8.5 MG/DL (ref 8.5–10.1)
CA-I BLD-MCNC: 9.6 MG/DL (ref 8.5–10.1)
CHLORIDE SERPL-SCNC: 87 MMOL/L (ref 97–108)
CHLORIDE SERPL-SCNC: 93 MMOL/L (ref 97–108)
CO2 SERPL-SCNC: 27 MMOL/L (ref 21–32)
CO2 SERPL-SCNC: 29 MMOL/L (ref 21–32)
CREAT SERPL-MCNC: 1.16 MG/DL (ref 0.7–1.3)
CREAT SERPL-MCNC: 1.25 MG/DL (ref 0.7–1.3)
DIFFERENTIAL METHOD BLD: ABNORMAL
EOSINOPHIL # BLD: 0.1 K/UL (ref 0–0.4)
EOSINOPHIL NFR BLD: 1 % (ref 0–7)
ERYTHROCYTE [DISTWIDTH] IN BLOOD BY AUTOMATED COUNT: 11.5 % (ref 11.5–14.5)
EST. AVERAGE GLUCOSE BLD GHB EST-MCNC: 123 MG/DL
GLOBULIN SER CALC-MCNC: 2.8 G/DL (ref 2–4)
GLUCOSE BLD STRIP.AUTO-MCNC: 103 MG/DL (ref 65–100)
GLUCOSE BLD STRIP.AUTO-MCNC: 106 MG/DL (ref 65–100)
GLUCOSE BLD STRIP.AUTO-MCNC: 106 MG/DL (ref 65–100)
GLUCOSE BLD STRIP.AUTO-MCNC: 124 MG/DL (ref 65–100)
GLUCOSE SERPL-MCNC: 305 MG/DL (ref 65–100)
GLUCOSE SERPL-MCNC: 84 MG/DL (ref 65–100)
HBA1C MFR BLD: 5.9 % (ref 4–5.6)
HCT VFR BLD AUTO: 36.8 % (ref 36.6–50.3)
HGB BLD-MCNC: 13.2 G/DL (ref 12.1–17)
IMM GRANULOCYTES # BLD AUTO: 0.1 K/UL (ref 0–0.04)
IMM GRANULOCYTES NFR BLD AUTO: 0 % (ref 0–0.5)
INR PPP: 1.9 (ref 0.9–1.1)
LYMPHOCYTES # BLD: 1.6 K/UL (ref 0.8–3.5)
LYMPHOCYTES NFR BLD: 10 % (ref 12–49)
MAGNESIUM SERPL-MCNC: 1.9 MG/DL (ref 1.6–2.4)
MCH RBC QN AUTO: 31.9 PG (ref 26–34)
MCHC RBC AUTO-ENTMCNC: 35.9 G/DL (ref 30–36.5)
MCV RBC AUTO: 88.9 FL (ref 80–99)
MONOCYTES # BLD: 1.2 K/UL (ref 0–1)
MONOCYTES NFR BLD: 7 % (ref 5–13)
NEUTS SEG # BLD: 13.2 K/UL (ref 1.8–8)
NEUTS SEG NFR BLD: 82 % (ref 32–75)
NRBC # BLD: 0 K/UL (ref 0–0.01)
NRBC BLD-RTO: 0 PER 100 WBC
OSMOLALITY SERPL: 270 MOSM/KG H2O
PERFORMED BY:: ABNORMAL
PLATELET # BLD AUTO: 324 K/UL (ref 150–400)
PMV BLD AUTO: 8.5 FL (ref 8.9–12.9)
POTASSIUM SERPL-SCNC: 3.1 MMOL/L (ref 3.5–5.1)
POTASSIUM SERPL-SCNC: 3.5 MMOL/L (ref 3.5–5.1)
PROT SERPL-MCNC: 6 G/DL (ref 6.4–8.2)
PROTHROMBIN TIME: 22.4 SEC (ref 11.9–14.6)
RBC # BLD AUTO: 4.14 M/UL (ref 4.1–5.7)
SODIUM SERPL-SCNC: 124 MMOL/L (ref 136–145)
SODIUM SERPL-SCNC: 129 MMOL/L (ref 136–145)
UFH PPP CHRO-ACNC: 0.53 IU/ML
UFH PPP CHRO-ACNC: <0.1 IU/ML
UFH PPP CHRO-ACNC: >1.1 IU/ML
WBC # BLD AUTO: 16.2 K/UL (ref 4.1–11.1)

## 2024-08-12 PROCEDURE — 85520 HEPARIN ASSAY: CPT

## 2024-08-12 PROCEDURE — 6370000000 HC RX 637 (ALT 250 FOR IP): Performed by: NURSE PRACTITIONER

## 2024-08-12 PROCEDURE — 6370000000 HC RX 637 (ALT 250 FOR IP): Performed by: STUDENT IN AN ORGANIZED HEALTH CARE EDUCATION/TRAINING PROGRAM

## 2024-08-12 PROCEDURE — 6370000000 HC RX 637 (ALT 250 FOR IP): Performed by: HOSPITALIST

## 2024-08-12 PROCEDURE — 2060000000 HC ICU INTERMEDIATE R&B

## 2024-08-12 PROCEDURE — 6360000002 HC RX W HCPCS: Performed by: INTERNAL MEDICINE

## 2024-08-12 PROCEDURE — 83036 HEMOGLOBIN GLYCOSYLATED A1C: CPT

## 2024-08-12 PROCEDURE — 83930 ASSAY OF BLOOD OSMOLALITY: CPT

## 2024-08-12 PROCEDURE — 83735 ASSAY OF MAGNESIUM: CPT

## 2024-08-12 PROCEDURE — 6370000000 HC RX 637 (ALT 250 FOR IP)

## 2024-08-12 PROCEDURE — 85025 COMPLETE CBC W/AUTO DIFF WBC: CPT

## 2024-08-12 PROCEDURE — 80048 BASIC METABOLIC PNL TOTAL CA: CPT

## 2024-08-12 PROCEDURE — 2580000003 HC RX 258

## 2024-08-12 PROCEDURE — 85610 PROTHROMBIN TIME: CPT

## 2024-08-12 PROCEDURE — 94640 AIRWAY INHALATION TREATMENT: CPT

## 2024-08-12 PROCEDURE — 82962 GLUCOSE BLOOD TEST: CPT

## 2024-08-12 PROCEDURE — 36415 COLL VENOUS BLD VENIPUNCTURE: CPT

## 2024-08-12 PROCEDURE — 80053 COMPREHEN METABOLIC PANEL: CPT

## 2024-08-12 PROCEDURE — 83880 ASSAY OF NATRIURETIC PEPTIDE: CPT

## 2024-08-12 RX ORDER — POTASSIUM CHLORIDE 20 MEQ/1
40 TABLET, EXTENDED RELEASE ORAL ONCE
Status: COMPLETED | OUTPATIENT
Start: 2024-08-12 | End: 2024-08-12

## 2024-08-12 RX ORDER — INSULIN GLARGINE 100 [IU]/ML
10 INJECTION, SOLUTION SUBCUTANEOUS DAILY
Status: DISCONTINUED | OUTPATIENT
Start: 2024-08-12 | End: 2024-08-14 | Stop reason: HOSPADM

## 2024-08-12 RX ORDER — GLUCAGON 1 MG/ML
1 KIT INJECTION PRN
Status: DISCONTINUED | OUTPATIENT
Start: 2024-08-12 | End: 2024-08-14 | Stop reason: HOSPADM

## 2024-08-12 RX ORDER — INSULIN LISPRO 100 [IU]/ML
0-4 INJECTION, SOLUTION INTRAVENOUS; SUBCUTANEOUS NIGHTLY
Status: DISCONTINUED | OUTPATIENT
Start: 2024-08-12 | End: 2024-08-14 | Stop reason: HOSPADM

## 2024-08-12 RX ORDER — INSULIN LISPRO 100 [IU]/ML
0-4 INJECTION, SOLUTION INTRAVENOUS; SUBCUTANEOUS
Status: DISCONTINUED | OUTPATIENT
Start: 2024-08-12 | End: 2024-08-14 | Stop reason: HOSPADM

## 2024-08-12 RX ORDER — DEXTROSE MONOHYDRATE 100 MG/ML
INJECTION, SOLUTION INTRAVENOUS CONTINUOUS PRN
Status: DISCONTINUED | OUTPATIENT
Start: 2024-08-12 | End: 2024-08-14 | Stop reason: HOSPADM

## 2024-08-12 RX ORDER — SODIUM CHLORIDE 9 MG/ML
INJECTION, SOLUTION INTRAVENOUS CONTINUOUS
Status: DISPENSED | OUTPATIENT
Start: 2024-08-12 | End: 2024-08-13

## 2024-08-12 RX ADMIN — METOPROLOL TARTRATE 25 MG: 25 TABLET, FILM COATED ORAL at 20:58

## 2024-08-12 RX ADMIN — AZITHROMYCIN DIHYDRATE 500 MG: 500 TABLET ORAL at 17:24

## 2024-08-12 RX ADMIN — HEPARIN SODIUM 15 UNITS/KG/HR: 10000 INJECTION, SOLUTION INTRAVENOUS at 06:07

## 2024-08-12 RX ADMIN — HEPARIN SODIUM 19 UNITS/KG/HR: 10000 INJECTION, SOLUTION INTRAVENOUS at 12:47

## 2024-08-12 RX ADMIN — POTASSIUM CHLORIDE 40 MEQ: 1500 TABLET, EXTENDED RELEASE ORAL at 09:24

## 2024-08-12 RX ADMIN — INSULIN GLARGINE 10 UNITS: 100 INJECTION, SOLUTION SUBCUTANEOUS at 09:24

## 2024-08-12 RX ADMIN — METOPROLOL TARTRATE 25 MG: 25 TABLET, FILM COATED ORAL at 09:24

## 2024-08-12 RX ADMIN — Medication 2 PUFF: at 08:05

## 2024-08-12 RX ADMIN — SODIUM CHLORIDE: 9 INJECTION, SOLUTION INTRAVENOUS at 17:28

## 2024-08-12 RX ADMIN — LISINOPRIL 20 MG: 20 TABLET ORAL at 09:24

## 2024-08-12 RX ADMIN — WARFARIN SODIUM 6 MG: 5 TABLET ORAL at 17:24

## 2024-08-12 RX ADMIN — Medication 2 PUFF: at 20:51

## 2024-08-12 RX ADMIN — POTASSIUM CHLORIDE 40 MEQ: 1500 TABLET, EXTENDED RELEASE ORAL at 17:27

## 2024-08-12 RX ADMIN — HEPARIN SODIUM 3500 UNITS: 1000 INJECTION INTRAVENOUS; SUBCUTANEOUS at 12:48

## 2024-08-12 ASSESSMENT — ENCOUNTER SYMPTOMS
DIARRHEA: 0
SHORTNESS OF BREATH: 0
COLOR CHANGE: 0
COUGH: 0
GASTROINTESTINAL NEGATIVE: 1
BACK PAIN: 0
ABDOMINAL PAIN: 0
CONSTIPATION: 0
SHORTNESS OF BREATH: 1

## 2024-08-12 NOTE — PLAN OF CARE
Problem: Discharge Planning  Goal: Discharge to home or other facility with appropriate resources  8/12/2024 1159 by Marlys Rivera RN  Outcome: Progressing  Flowsheets (Taken 8/12/2024 0928)  Discharge to home or other facility with appropriate resources:   Identify barriers to discharge with patient and caregiver   Arrange for needed discharge resources and transportation as appropriate   Identify discharge learning needs (meds, wound care, etc)   Refer to discharge planning if patient needs post-hospital services based on physician order or complex needs related to functional status, cognitive ability or social support system  8/12/2024 0042 by Hien Baez RN  Outcome: Progressing  Flowsheets (Taken 8/11/2024 2330)  Discharge to home or other facility with appropriate resources: Identify barriers to discharge with patient and caregiver     Problem: Pain  Goal: Verbalizes/displays adequate comfort level or baseline comfort level  8/12/2024 1159 by Marlys Rivera RN  Outcome: Progressing  8/12/2024 0042 by Hien Baez RN  Outcome: Progressing     Problem: Safety - Adult  Goal: Free from fall injury  8/12/2024 1159 by Marlys Rivera RN  Outcome: Progressing  8/12/2024 0042 by Hien Baez, RN  Outcome: Progressing

## 2024-08-12 NOTE — CARE COORDINATION
CM notified by [facility] that they had not heard from patient in order to set up his private pay home O2, CM met with patient to discuss.    Patient reported that he can cover the cost monthly and that he thought he could call after DC however CM explained that he would need to call prior to d/c, number provided to patient.    MEGGAN continues to follow, no other CM DCP needs at this time.

## 2024-08-13 ENCOUNTER — APPOINTMENT (OUTPATIENT)
Facility: HOSPITAL | Age: 64
DRG: 308 | End: 2024-08-13
Payer: MEDICARE

## 2024-08-13 LAB
ALBUMIN SERPL-MCNC: 3.5 G/DL (ref 3.5–5)
ALBUMIN/GLOB SERPL: 1.2 (ref 1.1–2.2)
ALP SERPL-CCNC: 94 U/L (ref 45–117)
ALT SERPL-CCNC: 96 U/L (ref 12–78)
ANION GAP SERPL CALC-SCNC: 3 MMOL/L (ref 5–15)
APPEARANCE UR: CLEAR
AST SERPL W P-5'-P-CCNC: 66 U/L (ref 15–37)
BACTERIA SPEC CULT: NORMAL
BACTERIA SPEC CULT: NORMAL
BACTERIA URNS QL MICRO: NEGATIVE /HPF
BASOPHILS # BLD: 0 K/UL (ref 0–0.1)
BASOPHILS NFR BLD: 0 % (ref 0–1)
BILIRUB SERPL-MCNC: 0.7 MG/DL (ref 0.2–1)
BILIRUB UR QL: NEGATIVE
BUN SERPL-MCNC: 11 MG/DL (ref 6–20)
BUN/CREAT SERPL: 10 (ref 12–20)
CA-I BLD-MCNC: 9.2 MG/DL (ref 8.5–10.1)
CHLORIDE SERPL-SCNC: 98 MMOL/L (ref 97–108)
CO2 SERPL-SCNC: 29 MMOL/L (ref 21–32)
COLOR UR: NORMAL
CREAT SERPL-MCNC: 1.09 MG/DL (ref 0.7–1.3)
DIFFERENTIAL METHOD BLD: ABNORMAL
ECHO BSA: 1.66 M2
ECHO EST RA PRESSURE: 3 MMHG
ECHO LA AREA 4C: 9 CM2
ECHO LA DIAMETER INDEX: 1.68 CM/M2
ECHO LA DIAMETER: 2.8 CM
ECHO LA MAJOR AXIS: 4.3 CM
ECHO LA VOL MOD A4C: 15 ML (ref 18–58)
ECHO LA VOLUME INDEX MOD A4C: 9 ML/M2 (ref 16–34)
ECHO LV E' LATERAL VELOCITY: 14 CM/S
ECHO LV E' SEPTAL VELOCITY: 8 CM/S
ECHO LV EDV A4C: 103 ML
ECHO LV EDV INDEX A4C: 62 ML/M2
ECHO LV EJECTION FRACTION A4C: 45 %
ECHO LV ESV A4C: 56 ML
ECHO LV ESV INDEX A4C: 34 ML/M2
ECHO RA AREA 4C: 15.5 CM2
ECHO RA END SYSTOLIC VOLUME APICAL 4 CHAMBER INDEX BSA: 26 ML/M2
ECHO RA VOLUME: 43 ML
ECHO RIGHT VENTRICULAR SYSTOLIC PRESSURE (RVSP): 19 MMHG
ECHO RV FREE WALL PEAK S': 14 CM/S
ECHO RV TAPSE: 2.1 CM (ref 1.7–?)
ECHO TV REGURGITANT MAX VELOCITY: 2.01 M/S
ECHO TV REGURGITANT PEAK GRADIENT: 16 MMHG
EOSINOPHIL # BLD: 0.2 K/UL (ref 0–0.4)
EOSINOPHIL NFR BLD: 2 % (ref 0–7)
ERYTHROCYTE [DISTWIDTH] IN BLOOD BY AUTOMATED COUNT: 11.6 % (ref 11.5–14.5)
GLOBULIN SER CALC-MCNC: 3 G/DL (ref 2–4)
GLUCOSE BLD STRIP.AUTO-MCNC: 102 MG/DL (ref 65–100)
GLUCOSE BLD STRIP.AUTO-MCNC: 76 MG/DL (ref 65–100)
GLUCOSE BLD STRIP.AUTO-MCNC: 99 MG/DL (ref 65–100)
GLUCOSE SERPL-MCNC: 106 MG/DL (ref 65–100)
GLUCOSE UR STRIP.AUTO-MCNC: NEGATIVE MG/DL
HCT VFR BLD AUTO: 35.3 % (ref 36.6–50.3)
HGB BLD-MCNC: 12.5 G/DL (ref 12.1–17)
HGB UR QL STRIP: NEGATIVE
IMM GRANULOCYTES # BLD AUTO: 0 K/UL (ref 0–0.04)
IMM GRANULOCYTES NFR BLD AUTO: 0 % (ref 0–0.5)
INR PPP: 2.3 (ref 0.9–1.1)
KETONES UR QL STRIP.AUTO: NEGATIVE MG/DL
LEUKOCYTE ESTERASE UR QL STRIP.AUTO: NEGATIVE
LYMPHOCYTES # BLD: 2.9 K/UL (ref 0.8–3.5)
LYMPHOCYTES NFR BLD: 24 % (ref 12–49)
Lab: NORMAL
Lab: NORMAL
MAGNESIUM SERPL-MCNC: 2 MG/DL (ref 1.6–2.4)
MCH RBC QN AUTO: 31.7 PG (ref 26–34)
MCHC RBC AUTO-ENTMCNC: 35.4 G/DL (ref 30–36.5)
MCV RBC AUTO: 89.6 FL (ref 80–99)
MONOCYTES # BLD: 1.2 K/UL (ref 0–1)
MONOCYTES NFR BLD: 10 % (ref 5–13)
MUCOUS THREADS URNS QL MICRO: NEGATIVE /LPF
NEUTS SEG # BLD: 7.6 K/UL (ref 1.8–8)
NEUTS SEG NFR BLD: 64 % (ref 32–75)
NITRITE UR QL STRIP.AUTO: NEGATIVE
NRBC # BLD: 0 K/UL (ref 0–0.01)
NRBC BLD-RTO: 0 PER 100 WBC
OSMOLALITY UR: 302 MOSM/KG H2O
PERFORMED BY:: ABNORMAL
PERFORMED BY:: NORMAL
PERFORMED BY:: NORMAL
PH UR STRIP: 7 (ref 5–8)
PLATELET # BLD AUTO: 357 K/UL (ref 150–400)
PMV BLD AUTO: 8.6 FL (ref 8.9–12.9)
POTASSIUM SERPL-SCNC: 4.3 MMOL/L (ref 3.5–5.1)
PROT SERPL-MCNC: 6.5 G/DL (ref 6.4–8.2)
PROT UR STRIP-MCNC: NEGATIVE MG/DL
PROTHROMBIN TIME: 25.8 SEC (ref 11.9–14.6)
RBC # BLD AUTO: 3.94 M/UL (ref 4.1–5.7)
RBC #/AREA URNS HPF: NORMAL /HPF (ref 0–5)
SODIUM SERPL-SCNC: 130 MMOL/L (ref 136–145)
SODIUM UR-SCNC: 105 MMOL/L
SP GR UR REFRACTOMETRY: 1.01 (ref 1–1.03)
UFH PPP CHRO-ACNC: <0.1 IU/ML
URINE CULTURE IF INDICATED: NORMAL
UROBILINOGEN UR QL STRIP.AUTO: 0.1 EU/DL (ref 0.1–1)
WBC # BLD AUTO: 11.9 K/UL (ref 4.1–11.1)
WBC URNS QL MICRO: NORMAL /HPF (ref 0–4)

## 2024-08-13 PROCEDURE — 83735 ASSAY OF MAGNESIUM: CPT

## 2024-08-13 PROCEDURE — 85025 COMPLETE CBC W/AUTO DIFF WBC: CPT

## 2024-08-13 PROCEDURE — 6370000000 HC RX 637 (ALT 250 FOR IP)

## 2024-08-13 PROCEDURE — 85520 HEPARIN ASSAY: CPT

## 2024-08-13 PROCEDURE — 80053 COMPREHEN METABOLIC PANEL: CPT

## 2024-08-13 PROCEDURE — 83935 ASSAY OF URINE OSMOLALITY: CPT

## 2024-08-13 PROCEDURE — 6370000000 HC RX 637 (ALT 250 FOR IP): Performed by: NURSE PRACTITIONER

## 2024-08-13 PROCEDURE — 85610 PROTHROMBIN TIME: CPT

## 2024-08-13 PROCEDURE — 6360000002 HC RX W HCPCS: Performed by: INTERNAL MEDICINE

## 2024-08-13 PROCEDURE — 6360000004 HC RX CONTRAST MEDICATION

## 2024-08-13 PROCEDURE — 81001 URINALYSIS AUTO W/SCOPE: CPT

## 2024-08-13 PROCEDURE — 84300 ASSAY OF URINE SODIUM: CPT

## 2024-08-13 PROCEDURE — 94761 N-INVAS EAR/PLS OXIMETRY MLT: CPT

## 2024-08-13 PROCEDURE — 6370000000 HC RX 637 (ALT 250 FOR IP): Performed by: HOSPITALIST

## 2024-08-13 PROCEDURE — 2580000003 HC RX 258

## 2024-08-13 PROCEDURE — 6360000002 HC RX W HCPCS: Performed by: NURSE PRACTITIONER

## 2024-08-13 PROCEDURE — C8924 2D TTE W OR W/O FOL W/CON,FU: HCPCS

## 2024-08-13 PROCEDURE — 2060000000 HC ICU INTERMEDIATE R&B

## 2024-08-13 PROCEDURE — 94618 PULMONARY STRESS TESTING: CPT

## 2024-08-13 PROCEDURE — 94640 AIRWAY INHALATION TREATMENT: CPT

## 2024-08-13 PROCEDURE — 82962 GLUCOSE BLOOD TEST: CPT

## 2024-08-13 RX ORDER — HYDRALAZINE HYDROCHLORIDE 50 MG/1
25 TABLET, FILM COATED ORAL EVERY 8 HOURS SCHEDULED
Status: DISCONTINUED | OUTPATIENT
Start: 2024-08-13 | End: 2024-08-14 | Stop reason: HOSPADM

## 2024-08-13 RX ORDER — WARFARIN SODIUM 5 MG/1
5 TABLET ORAL
Status: COMPLETED | OUTPATIENT
Start: 2024-08-13 | End: 2024-08-13

## 2024-08-13 RX ADMIN — HYDRALAZINE HYDROCHLORIDE 25 MG: 50 TABLET ORAL at 14:38

## 2024-08-13 RX ADMIN — AZITHROMYCIN DIHYDRATE 500 MG: 500 TABLET ORAL at 18:04

## 2024-08-13 RX ADMIN — WARFARIN SODIUM 5 MG: 5 TABLET ORAL at 18:04

## 2024-08-13 RX ADMIN — PERFLUTREN 2 ML: 6.52 INJECTION, SUSPENSION INTRAVENOUS at 09:39

## 2024-08-13 RX ADMIN — Medication 2 PUFF: at 20:12

## 2024-08-13 RX ADMIN — Medication 2 PUFF: at 11:15

## 2024-08-13 RX ADMIN — HEPARIN SODIUM 3500 UNITS: 1000 INJECTION INTRAVENOUS; SUBCUTANEOUS at 03:37

## 2024-08-13 RX ADMIN — HYDRALAZINE HYDROCHLORIDE 25 MG: 50 TABLET ORAL at 21:02

## 2024-08-13 RX ADMIN — HYDRALAZINE HYDROCHLORIDE 10 MG: 20 INJECTION, SOLUTION INTRAMUSCULAR; INTRAVENOUS at 03:18

## 2024-08-13 RX ADMIN — LISINOPRIL 20 MG: 20 TABLET ORAL at 10:22

## 2024-08-13 RX ADMIN — METOPROLOL TARTRATE 25 MG: 25 TABLET, FILM COATED ORAL at 10:22

## 2024-08-13 RX ADMIN — HEPARIN SODIUM 23 UNITS/KG/HR: 10000 INJECTION, SOLUTION INTRAVENOUS at 05:35

## 2024-08-13 RX ADMIN — INSULIN GLARGINE 10 UNITS: 100 INJECTION, SOLUTION SUBCUTANEOUS at 10:21

## 2024-08-13 RX ADMIN — METOPROLOL TARTRATE 25 MG: 25 TABLET, FILM COATED ORAL at 21:02

## 2024-08-13 RX ADMIN — SODIUM CHLORIDE: 9 INJECTION, SOLUTION INTRAVENOUS at 03:32

## 2024-08-13 ASSESSMENT — 6 MINUTE WALK TEST (6MWT)
O2 SATURATION: 92
HEART RATE: 75
O2 SATURATION: 95
BORG FATIGUE SCALE SCORE: NOTHING AT ALL
HEART RATE: 74
OXYGEN DEVICE: ROOM AIR
BORG FATIGUE SCALE SCORE: NOTHING AT ALL
BORG FATIGUE SCALE SCORE: NOTHING AT ALL
O2 FLOW RATE (L/MIN): 0
O2 SATURATION: 93
O2 FLOW RATE (L/MIN): 0
DID PATIENT STOP OR PAUSE BEFORE 6 MINUTES?: NO
BORG DYSPNEA SCALE SCORE: NOTHING AT ALL
O2 SATURATION: 95
COMMENTS: ON RA
BORG DYSPNEA SCALE SCORE: NOTHING AT ALL
ANY PROBLEMS WHILE EXERCISING?: NO
HEART RATE: 77
BORG DYSPNEA SCALE SCORE: NOTHING AT ALL

## 2024-08-13 ASSESSMENT — PAIN SCALES - GENERAL: PAINLEVEL_OUTOF10: 0

## 2024-08-13 ASSESSMENT — ENCOUNTER SYMPTOMS
ABDOMINAL PAIN: 0
GASTROINTESTINAL NEGATIVE: 1
BACK PAIN: 0
DIARRHEA: 0
COLOR CHANGE: 0
CONSTIPATION: 0
COUGH: 0
SHORTNESS OF BREATH: 1
SHORTNESS OF BREATH: 0

## 2024-08-13 NOTE — PLAN OF CARE
Problem: Discharge Planning  Goal: Discharge to home or other facility with appropriate resources  8/13/2024 1139 by Marlys Rivera RN  Outcome: Progressing  8/12/2024 2331 by Hien Baez RN  Outcome: Progressing  Flowsheets (Taken 8/12/2024 2015)  Discharge to home or other facility with appropriate resources: Identify barriers to discharge with patient and caregiver     Problem: Pain  Goal: Verbalizes/displays adequate comfort level or baseline comfort level  8/13/2024 1139 by Marlys Rivera, RN  Outcome: Progressing  8/12/2024 2331 by Hien Baez, RN  Outcome: Progressing     Problem: Safety - Adult  Goal: Free from fall injury  8/13/2024 1139 by Marlys Rivera RN  Outcome: Progressing  8/12/2024 2331 by Hien Baez, RN  Outcome: Progressing

## 2024-08-13 NOTE — CARE COORDINATION
CM notified in IDR that patient will be d/c with coumadin and will need frequent INR checks, CM met with patient to discuss HH to provide INR checks, patient agreeable, no preference for HH agency, referral sent, patient accepted with [facility].    IMM delivered with anticipation of d/c tomorrow.

## 2024-08-14 VITALS
BODY MASS INDEX: 21.33 KG/M2 | DIASTOLIC BLOOD PRESSURE: 72 MMHG | HEART RATE: 56 BPM | TEMPERATURE: 97.5 F | WEIGHT: 132.72 LBS | SYSTOLIC BLOOD PRESSURE: 119 MMHG | HEIGHT: 66 IN | RESPIRATION RATE: 18 BRPM | OXYGEN SATURATION: 100 %

## 2024-08-14 LAB
ALBUMIN SERPL-MCNC: 3.2 G/DL (ref 3.5–5)
ALBUMIN/GLOB SERPL: 1 (ref 1.1–2.2)
ALP SERPL-CCNC: 87 U/L (ref 45–117)
ALT SERPL-CCNC: 94 U/L (ref 12–78)
ANION GAP SERPL CALC-SCNC: 7 MMOL/L (ref 5–15)
AST SERPL W P-5'-P-CCNC: 54 U/L (ref 15–37)
BASOPHILS # BLD: 0 K/UL (ref 0–0.1)
BASOPHILS NFR BLD: 0 % (ref 0–1)
BILIRUB SERPL-MCNC: 0.8 MG/DL (ref 0.2–1)
BUN SERPL-MCNC: 9 MG/DL (ref 6–20)
BUN/CREAT SERPL: 9 (ref 12–20)
CA-I BLD-MCNC: 9 MG/DL (ref 8.5–10.1)
CHLORIDE SERPL-SCNC: 95 MMOL/L (ref 97–108)
CO2 SERPL-SCNC: 27 MMOL/L (ref 21–32)
CREAT SERPL-MCNC: 1.01 MG/DL (ref 0.7–1.3)
DIFFERENTIAL METHOD BLD: ABNORMAL
EOSINOPHIL # BLD: 0.2 K/UL (ref 0–0.4)
EOSINOPHIL NFR BLD: 2 % (ref 0–7)
ERYTHROCYTE [DISTWIDTH] IN BLOOD BY AUTOMATED COUNT: 11.8 % (ref 11.5–14.5)
GLOBULIN SER CALC-MCNC: 3.3 G/DL (ref 2–4)
GLUCOSE BLD STRIP.AUTO-MCNC: 124 MG/DL (ref 65–100)
GLUCOSE BLD STRIP.AUTO-MCNC: 98 MG/DL (ref 65–100)
GLUCOSE BLD STRIP.AUTO-MCNC: 99 MG/DL (ref 65–100)
GLUCOSE SERPL-MCNC: 127 MG/DL (ref 65–100)
HCT VFR BLD AUTO: 36.7 % (ref 36.6–50.3)
HGB BLD-MCNC: 13.2 G/DL (ref 12.1–17)
IMM GRANULOCYTES # BLD AUTO: 0 K/UL (ref 0–0.04)
IMM GRANULOCYTES NFR BLD AUTO: 0 % (ref 0–0.5)
INR PPP: 2.6 (ref 0.9–1.1)
LYMPHOCYTES # BLD: 2.2 K/UL (ref 0.8–3.5)
LYMPHOCYTES NFR BLD: 18 % (ref 12–49)
MAGNESIUM SERPL-MCNC: 1.9 MG/DL (ref 1.6–2.4)
MCH RBC QN AUTO: 31.8 PG (ref 26–34)
MCHC RBC AUTO-ENTMCNC: 36 G/DL (ref 30–36.5)
MCV RBC AUTO: 88.4 FL (ref 80–99)
MONOCYTES # BLD: 1.1 K/UL (ref 0–1)
MONOCYTES NFR BLD: 10 % (ref 5–13)
NEUTS SEG # BLD: 8.4 K/UL (ref 1.8–8)
NEUTS SEG NFR BLD: 70 % (ref 32–75)
NRBC # BLD: 0 K/UL (ref 0–0.01)
NRBC BLD-RTO: 0 PER 100 WBC
PERFORMED BY:: ABNORMAL
PERFORMED BY:: NORMAL
PERFORMED BY:: NORMAL
PLATELET # BLD AUTO: 362 K/UL (ref 150–400)
PMV BLD AUTO: 8.5 FL (ref 8.9–12.9)
POTASSIUM SERPL-SCNC: 3.3 MMOL/L (ref 3.5–5.1)
PROT SERPL-MCNC: 6.5 G/DL (ref 6.4–8.2)
PROTHROMBIN TIME: 28.2 SEC (ref 11.9–14.6)
RBC # BLD AUTO: 4.15 M/UL (ref 4.1–5.7)
SODIUM SERPL-SCNC: 129 MMOL/L (ref 136–145)
WBC # BLD AUTO: 12 K/UL (ref 4.1–11.1)

## 2024-08-14 PROCEDURE — 85025 COMPLETE CBC W/AUTO DIFF WBC: CPT

## 2024-08-14 PROCEDURE — 6370000000 HC RX 637 (ALT 250 FOR IP): Performed by: NURSE PRACTITIONER

## 2024-08-14 PROCEDURE — 94640 AIRWAY INHALATION TREATMENT: CPT

## 2024-08-14 PROCEDURE — 83735 ASSAY OF MAGNESIUM: CPT

## 2024-08-14 PROCEDURE — 80053 COMPREHEN METABOLIC PANEL: CPT

## 2024-08-14 PROCEDURE — 6370000000 HC RX 637 (ALT 250 FOR IP)

## 2024-08-14 PROCEDURE — 82962 GLUCOSE BLOOD TEST: CPT

## 2024-08-14 PROCEDURE — 94761 N-INVAS EAR/PLS OXIMETRY MLT: CPT

## 2024-08-14 PROCEDURE — 36415 COLL VENOUS BLD VENIPUNCTURE: CPT

## 2024-08-14 PROCEDURE — 85610 PROTHROMBIN TIME: CPT

## 2024-08-14 PROCEDURE — 94667 MNPJ CHEST WALL 1ST: CPT

## 2024-08-14 RX ORDER — BUDESONIDE AND FORMOTEROL FUMARATE DIHYDRATE 160; 4.5 UG/1; UG/1
2 AEROSOL RESPIRATORY (INHALATION)
Qty: 10.2 G | Refills: 0 | Status: SHIPPED | OUTPATIENT
Start: 2024-08-14

## 2024-08-14 RX ORDER — ALBUTEROL SULFATE 90 UG/1
2 AEROSOL, METERED RESPIRATORY (INHALATION) EVERY 4 HOURS PRN
Qty: 18 G | Refills: 0 | Status: SHIPPED | OUTPATIENT
Start: 2024-08-14

## 2024-08-14 RX ORDER — LISINOPRIL 20 MG/1
20 TABLET ORAL DAILY
Qty: 30 TABLET | Refills: 0 | Status: SHIPPED | OUTPATIENT
Start: 2024-08-15

## 2024-08-14 RX ORDER — HYDRALAZINE HYDROCHLORIDE 25 MG/1
25 TABLET, FILM COATED ORAL EVERY 8 HOURS SCHEDULED
Qty: 90 TABLET | Refills: 0 | Status: SHIPPED | OUTPATIENT
Start: 2024-08-14

## 2024-08-14 RX ORDER — POTASSIUM CHLORIDE 20 MEQ/1
40 TABLET, EXTENDED RELEASE ORAL ONCE
Status: COMPLETED | OUTPATIENT
Start: 2024-08-14 | End: 2024-08-14

## 2024-08-14 RX ADMIN — HYDRALAZINE HYDROCHLORIDE 25 MG: 50 TABLET ORAL at 14:40

## 2024-08-14 RX ADMIN — HYDRALAZINE HYDROCHLORIDE 25 MG: 50 TABLET ORAL at 06:09

## 2024-08-14 RX ADMIN — POTASSIUM CHLORIDE 40 MEQ: 1500 TABLET, EXTENDED RELEASE ORAL at 09:39

## 2024-08-14 RX ADMIN — METOPROLOL TARTRATE 25 MG: 25 TABLET, FILM COATED ORAL at 08:12

## 2024-08-14 RX ADMIN — Medication 2 PUFF: at 08:03

## 2024-08-14 RX ADMIN — LISINOPRIL 20 MG: 20 TABLET ORAL at 08:12

## 2024-08-14 RX ADMIN — INSULIN GLARGINE 10 UNITS: 100 INJECTION, SOLUTION SUBCUTANEOUS at 09:02

## 2024-08-14 ASSESSMENT — ENCOUNTER SYMPTOMS
SHORTNESS OF BREATH: 1
GASTROINTESTINAL NEGATIVE: 1

## 2024-08-14 NOTE — PROGRESS NOTES
CARDIOLOGY PROGRESS NOTE      Patient Name: Andrea Rubio  Age: 64 y.o.  Gender:male  :1960  MRN: [id number]    Patient seen and examined. This is a patient with a history of  COPD, hypertension not on prescription meds due to lack of insurance who presented with shortness of breath and palpitations now being followed for Afib RVR. Ambulating in his room. Converted to sinus rhythm yesterday afternoon. Denies chest pain. Reports breathing at baseline. No other complaints reported.    Telemetry reviewed.    Pertinent review of systems items noted above, all other systems are negative. Current medications reviewed.    Physical Examination    Allergies   Allergen Reactions    Cardizem [Diltiazem] Hives and Itching     Vitals:    24 1217   BP: (!) 163/71   Pulse: 69   Resp: 18   Temp: 97.7 °F (36.5 °C)   SpO2: 97%     Vital signs are stable  No apparent distress.  Heart has an irregularly irregular rhythm  Lungs are coarse   Abdomen is soft, nontender, normal bowel sounds.  Extremities have no edema  Skin is dry and warm.  Normal affect    Labs reviewed:  Recent Results (from the past 12 hour(s))   Basic Metabolic Panel    Collection Time: 24  3:22 AM   Result Value Ref Range    Sodium 130 (L) 136 - 145 mmol/L    Potassium 3.5 3.5 - 5.1 mmol/L    Chloride 93 (L) 97 - 108 mmol/L    CO2 30 21 - 32 mmol/L    Anion Gap 7 5 - 15 mmol/L    Glucose 111 (H) 65 - 100 mg/dL    BUN 21 (H) 6 - 20 mg/dL    Creatinine 1.04 0.70 - 1.30 mg/dL    BUN/Creatinine Ratio 20 12 - 20      Est, Glom Filt Rate 79 >60 ml/min/1.73m2    Calcium 9.0 8.5 - 10.1 mg/dL   CBC    Collection Time: 24  3:22 AM   Result Value Ref Range    WBC 22.1 (H) 4.1 - 11.1 K/uL    RBC 4.08 (L) 4.10 - 5.70 M/uL    Hemoglobin 13.0 12.1 - 17.0 g/dL    Hematocrit 36.1 (L) 36.6 - 50.3 %    MCV 88.5 80.0 - 99.0 FL    MCH 31.9 26.0 - 34.0 PG    MCHC 36.0 30.0 - 36.5 g/dL    RDW 11.6 11.5 - 14.5 %    Platelets 338 150 - 400 K/uL    MPV 8.9 
    CARDIOLOGY PROGRESS NOTE      Patient Name: Andrea Rubio  Age: 64 y.o.  Gender:male  :1960  MRN: [id number]    Patient seen and examined. This is a patient with a history of  COPD, hypertension not on prescription meds due to lack of insurance who presented with shortness of breath and palpitations now being followed for Afib RVR. Converted to sinus rhythm. Denies chest pain. Reports breathing at baseline. No other complaints reported.    Telemetry reviewed. NSR 60's  EKG shows normal sinus rhythm with prolonged Qtc of 495 ms.    Pertinent review of systems items noted above, all other systems are negative. Current medications reviewed.    Physical Examination    Allergies   Allergen Reactions    Cardizem [Diltiazem] Hives and Itching     Vitals:    24 1609   BP: (!) 149/80   Pulse: 66   Resp: 18   Temp: 98.4 °F (36.9 °C)   SpO2: 93%     Vital signs are stable  No apparent distress.  Heart has an irregularly irregular rhythm  Lungs are coarse   Abdomen is soft, nontender, normal bowel sounds.  Extremities have no edema  Skin is dry and warm.  Normal affect    Labs reviewed:  Recent Results (from the past 12 hour(s))   Heparin, Anti-XA    Collection Time: 24  7:52 AM   Result Value Ref Range    Heparin Xa,LMWH and Unfrac 0.21 IU/mL   EKG 12 Lead    Collection Time: 24  8:34 AM   Result Value Ref Range    Ventricular Rate 66 BPM    Atrial Rate 66 BPM    P-R Interval 136 ms    QRS Duration 96 ms    Q-T Interval 476 ms    QTc Calculation (Bazett) 499 ms    P Axis 78 degrees    R Axis 55 degrees    T Axis -24 degrees    Diagnosis       Normal sinus rhythm  Voltage criteria for left ventricular hypertrophy  Abnormal QRS-T angle, consider primary T wave abnormality  Prolonged QT  Abnormal ECG  When compared with ECG of 09-AUG-2024 08:07,  No significant change was found  Confirmed by YEE LORA ([id number]) on 2024 10:41:46 AM          Case discussed with Dr. Lora and our impression 
    CARDIOLOGY PROGRESS NOTE      Patient Name: Andrea Rubio  Age: 64 y.o.  Gender:male  :1960  MRN: [id number]    Patient seen and examined. This is a patient with a history of  COPD, hypertension not on prescription meds due to lack of insurance who presented with shortness of breath and palpitations now being followed for Afib RVR. Converted to sinus rhythm. Denies chest pain. Reports breathing at baseline. No other complaints reported.    Telemetry reviewed. NSR 60's  EKG shows normal sinus rhythm with prolonged Qtc of 495 ms.    Pertinent review of systems items noted above, all other systems are negative. Current medications reviewed.    Physical Examination    Allergies   Allergen Reactions    Cardizem [Diltiazem] Hives and Itching     Vitals:    24 1133   BP: (!) 160/80   Pulse: 68   Resp: 18   Temp: 98.1 °F (36.7 °C)   SpO2: 98%     Vital signs are stable  No apparent distress.  Heart has an irregularly irregular rhythm  Lungs are coarse   Abdomen is soft, nontender, normal bowel sounds.  Extremities have no edema  Skin is dry and warm.  Normal affect    Labs reviewed:  Recent Results (from the past 12 hour(s))   Heparin, Anti-XA    Collection Time: 24  8:03 AM   Result Value Ref Range    Heparin Xa,LMWH and Unfrac <0.10 IU/mL   Brain Natriuretic Peptide    Collection Time: 24  8:03 AM   Result Value Ref Range    NT Pro- (H) <125 pg/mL   Hemoglobin A1c    Collection Time: 24  8:03 AM   Result Value Ref Range    Hemoglobin A1C 5.9 (H) 4.0 - 5.6 %    Estimated Avg Glucose 123 mg/dL   POCT Glucose    Collection Time: 24  9:09 AM   Result Value Ref Range    POC Glucose 124 (H) 65 - 100 mg/dL    Performed by: THALIA TYSON    POCT Glucose    Collection Time: 24 11:31 AM   Result Value Ref Range    POC Glucose 106 (H) 65 - 100 mg/dL    Performed by: THALIA TYSON       Case discussed with Dr. Reyna and our impression and recommendations are as 
    CARDIOLOGY PROGRESS NOTE      Patient Name: Andrea Rubio  Age: 64 y.o.  Gender:male  :1960  MRN: [id number]    Patient seen and examined. This is a patient with a history of  COPD, hypertension not on prescription meds due to lack of insurance who presented with shortness of breath and palpitations now being followed for Afib RVR. Converted to sinus rhythm. Denies chest pain. Reports breathing at baseline. He is eager for discharge. No other complaints reported.    Telemetry reviewed. NSR 80s       Pertinent review of systems items noted above, all other systems are negative. Current medications reviewed.    Physical Examination    Allergies   Allergen Reactions    Cardizem [Diltiazem] Hives and Itching     Vitals:    24 0725   BP: 119/86   Pulse: 93   Resp: 18   Temp: 98.2 °F (36.8 °C)   SpO2:      Vital signs are stable  No apparent distress.  Heart has an irregularly irregular rhythm  Lungs are coarse   Abdomen is soft, nontender, normal bowel sounds.  Extremities have no edema  Skin is dry and warm.  Normal affect    Labs reviewed:  Recent Results (from the past 12 hour(s))   Sodium, urine, random    Collection Time: 24  3:20 AM   Result Value Ref Range    SODIUM, RANDOM URINE 105 mmol/L   Osmolality, Urine    Collection Time: 24  3:20 AM   Result Value Ref Range    Osmolality, Ur 302 MOSM/kg H2O   Urinalysis with Reflex to Culture    Collection Time: 24  3:20 AM    Specimen: Urine   Result Value Ref Range    Color, UA Yellow/Straw      Appearance Clear Clear      Specific Gravity, UA 1.006 1.003 - 1.030      pH, Urine 7.0 5.0 - 8.0      Protein, UA Negative Negative mg/dL    Glucose, Ur Negative Negative mg/dL    Ketones, Urine Negative Negative mg/dL    Bilirubin, Urine Negative Negative      Blood, Urine Negative Negative      Urobilinogen, Urine 0.1 0.1 - 1.0 EU/dL    Nitrite, Urine Negative Negative      Leukocyte Esterase, Urine Negative Negative      WBC, UA 0-4 0 
    CARDIOLOGY PROGRESS NOTE      Patient Name: Andrea Rubio  Age: 64 y.o.  Gender:male  :1960  MRN: [id number]    Patient seen and examined. This is a patient with a history of  COPD, hypertension not on prescription meds due to lack of insurance who presented with shortness of breath and palpitations now being followed for Afib RVR. Resting comfortably in bed. Remains in Afib, rates 78-130s. Denies chest pain. Reports breathing at baseline. No other complaints reported.    Telemetry reviewed.    Pertinent review of systems items noted above, all other systems are negative. Current medications reviewed.    Physical Examination    Allergies   Allergen Reactions    Cardizem [Diltiazem] Hives and Itching     Vitals:    24 1228   BP: (!) 123/94   Pulse: 100   Resp: 18   Temp: 98.6 °F (37 °C)   SpO2: 98%     Vital signs are stable  No apparent distress.  Heart has an irregularly irregular rhythm  Lungs are coarse   Abdomen is soft, nontender, normal bowel sounds.  Extremities have no edema  Skin is dry and warm.  Normal affect    Labs reviewed:  Recent Results (from the past 12 hour(s))   Heparin, Anti-XA    Collection Time: 24 11:52 AM   Result Value Ref Range    Heparin Xa,LMWH and Unfrac 0.33 IU/mL        Case discussed with Dr. Taylor and our impression and recommendations are as follows:  Afib RVR, rates improved   Up to 120s acceptable  Continue amiodarone drip  Continue heparin drip, look to transition to OAC at discharge   Continue metoprolol tartrate 25mg BID  Continue telemetry monitoring  Maintain electrolytes  TSH normal  Tentatively plan for SRINIVASA/DCCV tomorrow afternoon if does not convert.   HFrEF, echo this admission with EF 25-30%, NWM   Global HK present  Will need ischemic evaluation IP vs OP pending course    GDMT as able, Lisinopril and BB on board   BNP 1482   Will give dose of IV Lasix today  Need strict I&Os, daily weights   Hypertension: BP stable. Continue current regimen 
    Hospitalist Progress Note    NAME:   Andrea Rubio   : 1960   MRN: [id number]     Date/Time: 2024 2:09 PM  Patient PCP: No primary care provider on file.    Estimated discharge date: 24-48 hours  Barriers: therapeutic INR    HOSPITAL COURSE:    Andrea Rubio is a 64 y.o.  male with hypertension and COPD who presented to the ED complaining of shortness of breath and palpitations. Symptoms have been ongoing for the past 2-3 days and have progressively worsened prompting him to seek medical treatment. Patient was using an OTC inhaler, Primatene Mist but without symptom relief. Patient was using the inhaler multiple times a day.  Patient received metoprolol en route with minimal improvement in HR. On arrival to the ED, EKG showed atrial fibrillation with rapid ventricular rate, . Patient received IV Cardizem with appropriate HR response. Patient developed an allergic reaction at the insertion site and required benadryl. Symptoms improved but patient's blood pressure dropped requiring a fluid bolus. Patient stated he was taking  blood pressure medications and inhalers in the past but he currently does not have insurance coverage.   Cardiology following the patient. Patient was started on amio gtt as well as heparin. Plan for cardioversion , if patient does not convert on his own. Echo showed EF 25-30% with severely reduced LV function. Grade II diastolic dysfunction. Global hypokinesis. Ascending Ao 4.1 cm.   Patient converted to NSR overnight. Amiodarone gtt discontinued and transitioned to oral. Continue heparin and start patient on Coumadin. Pharmacy consulted for management. Case was discussed with pulmonary and the patient will require nocturnal O2 at 2L NC.  INR 1.0, will need to be between 2-3. Continue Coumadin dosing per pharmacy protocol. Repeat overnight oximetry. EKG this morning showed Qtc 499, discontinue amiodarone per cardiology recommendations. INR 1.4.     Assessment / 
    Hospitalist Progress Note    NAME:   Andrea Rubio   : 1960   MRN: [id number]     Date/Time: 2024 2:08 PM  Patient PCP: No primary care provider on file.    Estimated discharge date: 24-48 hours  Barriers: therapeutic INR, correction to electrolyte abnormalities    HOSPITAL COURSE:  Andrea Rubio is a 64 y.o.  male with hypertension and COPD who presented to the ED complaining of shortness of breath and palpitations. Symptoms have been ongoing for the past 2-3 days and have progressively worsened prompting him to seek medical treatment. Patient was using an OTC inhaler, Primatene Mist but without symptom relief. Patient was using the inhaler multiple times a day.  Patient received metoprolol en route with minimal improvement in HR. On arrival to the ED, EKG showed atrial fibrillation with rapid ventricular rate, . Patient received IV Cardizem with appropriate HR response. Patient developed an allergic reaction at the insertion site and required benadryl. Cardiology following the patient. Patient was started on amio gtt as well as heparin. Plan for cardioversion , but patient converted to normal sinus rhythm.  Amiodarone discontinued.  Echo showed EF 25-30% with severely reduced LV function. Grade II diastolic dysfunction. Global hypokinesis. Ascending Aortic root 4.1 cm.   Continue heparin and start patient on Coumadin. Pharmacy consulted for management. INR 1.0, will need to be between 2-3. Continue Coumadin dosing per pharmacy protocol. Case was discussed with pulmonary and the patient will require nocturnal O2 at 2L NC.  Repeat overnight oximetry. EKG this morning showed Qtc 499, discontinue amiodarone per cardiology recommendations.     Assessment / Plan:  HFrEF  - Cardiology following, appreciate recommendations  - Echo: EF 25-30%  - Continue metoprolol and lisinopril to 10 mg   - GDMT as able; medication cost will be a factor  - Will need ischemic evaluation in the OP 
    Hospitalist Progress Note    NAME:   Andrea Rubio   : 1960   MRN: [id number]     Date/Time: 2024 2:30 PM  Patient PCP: No primary care provider on file.    Estimated discharge date:48 hours  Barriers: SRINIVASA/Cardioversion , cardiology recommendations      HOSPITAL COURSE:    Andrea Rubio is a 64 y.o.  male with hypertension and COPD who presented to the ED complaining of shortness of breath and palpitations. Symptoms have been ongoing for the past 2-3 days and have progressively worsened prompting him to seek medical treatment. Patient was using an OTC inhaler, Primatene Mist but without symptom relief. Patient was using the inhaler multiple times a day.  Patient received metoprolol en route with minimal improvement in HR. On arrival to the ED, EKG showed atrial fibrillation with rapid ventricular rate, . Patient received IV Cardizem with appropriate HR response. Patient developed an allergic reaction at the insertion site and required benadryl. Symptoms improved but patient's blood pressure dropped requiring a fluid bolus. Patient stated he was taking  blood pressure medications and inhalers in the past but he currently does not have insurance coverage.   Cardiology following the patient. Patient was started on amio gtt as well as heparin. Plan for cardioversion , if patient does not convert on his own. Echo showed EF 25-30% with severely reduced LV function. Grade II diastolic dysfunction. Global hypokinesis. Ascending Ao 4.1 cm.       Assessment / Plan:    HFrEF  - Cardiology following, appreciate recommendations  - Echo: EF 25-30%  - BNP 1482  - IV lasix one dose per cards recommendations  - Continue metoprolol and lisinopril  - GDMT as able; medication cost will be a factor  - Will need ischemic evaluation likely in the OP setting      Atrial fibrillation with RVR  - complaining of shortness of breath and palpitations for the past 2-3 days  -Atrial fibrillation: HR 110s per 
    Hospitalist Progress Note    NAME:   Andrea Rubio   : 1960   MRN: [id number]     Date/Time: 2024 5:15 PM  Patient PCP: No primary care provider on file.    Estimated discharge date:48 hours  Barriers: SRINIVASA/Cardioversion , cardiology recommendations      HOSPITAL COURSE:    Andrea Rubio is a 64 y.o.  male with hypertension and COPD who presented to the ED complaining of shortness of breath and palpitations. Symptoms have been ongoing for the past 2-3 days and have progressively worsened prompting him to seek medical treatment. Patient was using an OTC inhaler, Primatene Mist but without symptom relief. Patient was using the inhaler multiple times a day.  Patient received metoprolol en route with minimal improvement in HR. On arrival to the ED, EKG showed atrial fibrillation with rapid ventricular rate, . Patient received IV Cardizem with appropriate HR response. Patient developed an allergic reaction at the insertion site and required benadryl. Symptoms improved but patient's blood pressure dropped requiring a fluid bolus. Patient stated he was taking  blood pressure medications and inhalers in the past but he currently does not have insurance coverage.   Cardiology following the patient. Patient was started on amio gtt as well as heparin. Plan for cardioversion , if patient does not convert on his own. Echo showed EF 25-30% with severely reduced LV function. Grade II diastolic dysfunction. Global hypokinesis. Ascending Ao 4.1 cm.   Patient converted to NSR overnight. Amiodarone gtt discontinued and transitioned to oral. Continue heparin and start patient on Coumadin. Pharmacy consulted for management. Case was discussed with pulmonary and the patient will require nocturnal O2 at 2L NC. Oxygen order was placed, CM aware.        Assessment / Plan:    HFrEF  - Cardiology following, appreciate recommendations  - Echo: EF 25-30%  - BNP 1482  - IV lasix one dose per cards 
    Hospitalist Progress Note    NAME:   Andrea Rubio   : 1960   MRN: [id number]     Date/Time: 2024 3:12 PM  Patient PCP: No primary care provider on file.    Estimated discharge date: 24-48 hours  Barriers: therapeutic INR, correction to electrolyte abnormalities    HOSPITAL COURSE:  Andrea Rubio is a 64 y.o.  male with hypertension and COPD who presented to the ED complaining of shortness of breath and palpitations. Symptoms have been ongoing for the past 2-3 days and have progressively worsened prompting him to seek medical treatment. Patient was using an OTC inhaler, Primatene Mist but without symptom relief. Patient was using the inhaler multiple times a day.  Patient received metoprolol en route with minimal improvement in HR. On arrival to the ED, EKG showed atrial fibrillation with rapid ventricular rate, . Patient received IV Cardizem with appropriate HR response. Patient developed an allergic reaction at the insertion site and required benadryl. Cardiology following the patient. Patient was started on amio gtt as well as heparin. Plan for cardioversion , but patient converted to normal sinus rhythm.  Amiodarone discontinued.  Echo showed EF 25-30% with severely reduced LV function. Grade II diastolic dysfunction. Global hypokinesis. Ascending Aortic root 4.1 cm.   Continue heparin and start patient on Coumadin. Pharmacy consulted for management. INR 1.0, will need to be between 2-3. Continue Coumadin dosing per pharmacy protocol. Case was discussed with pulmonary and the patient will require nocturnal O2 at 2L NC.  Repeat overnight oximetry. EKG this morning showed Qtc 499, discontinue amiodarone per cardiology recommendations.     Assessment / Plan:  HFrEF  - Cardiology following, appreciate recommendations  - Echo: EF 25-30%.  Repeat shows EF 55 to 60%.  - Continue metoprolol and lisinopril to 10 mg   - GDMT as able; medication cost will be a factor  - Will need ischemic 
  Physician Progress Note      PATIENT:               MICK KRAUSE  CSN #:                  [id number]  :                       1960  ADMIT DATE:       2024 10:51 AM  DISCH DATE:  RESPONDING  PROVIDER #:        Harini Morocho NP          QUERY TEXT:    Pt admitted with ABELARDO. Lisa and has CHF documented. If possible, please document   in progress notes and discharge summary further specificity regarding the type   and acuity of CHF:    The medical record reflects the following:  Risk Factors: 64 year old male, SOB, palpitations, COPD  Clinical Indicators:  Attending PN - HFrEF  - Cardiology following, appreciate recommendations  - Echo: EF 25-30%  - BNP 1482  Treatment: IV Lasix 40mg, strict I&Os, daily weights      Please email [email or web address] with any questions  Options provided:  -- Acute on Chronic Systolic CHF/HFrEF  -- Acute Systolic CHF/HFrEF  -- Chronic Systolic CHF/HFrEF  -- Other - I will add my own diagnosis  -- Disagree - Not applicable / Not valid  -- Disagree - Clinically unable to determine / Unknown  -- Refer to Clinical Documentation Reviewer    PROVIDER RESPONSE TEXT:    This patient is in acute systolic CHF/HFrEF.    Query created by: Kyleigh Donovan on 2024 5:35 AM      Electronically signed by:  Harini Morocho NP 2024 10:31 AM          
0508: Attempted to reached out to Dr. Pro regarding patients sodium of 124 and potassium of 3.1. No response.  
4 Eyes Skin Assessment     NAME:  Andrea Rubio  YOB: 1960  MEDICAL RECORD NUMBER:  [id number]    The patient is being assessed for  Admission    I agree that at least one RN has performed a thorough Head to Toe Skin Assessment on the patient. ALL assessment sites listed below have been assessed.      Areas assessed by both nurses:    Head, Face, Ears, Shoulders, Back, Chest, Arms, Elbows, Hands, and Legs. Feet and Heels        Does the Patient have a Wound? No noted wound(s)       Deny Prevention initiated by RN: Yes  Wound Care Orders initiated by RN: No    Pressure Injury (Stage 3,4, Unstageable, DTI, NWPT, and Complex wounds) if present, place Wound referral order by RN under : No    New Ostomies, if present place, Ostomy referral order under : No     Nurse 1 eSignature: Electronically signed by Marlys Rivera RN on 8/7/24 at 8:10 PM EDT    **SHARE this note so that the co-signing nurse can place an eSignature**    Nurse 2 eSignature: Electronically signed by Bernarda Abdi RN on 8/7/24 at 8:13 PM EDT   
Ambulatory oximetry performed, details below. Patient did not require supplemental oxygen to maintain SpO2 > 90%   08/13/24 1500   Data Measured Before Walk   HR 75   O2 Saturation 95   O2 Device Room air   Chad Dyspnea Scale 0   Chad Fatigue Scale 0   Data Measured During the Walk   Heart Rate 85   O2 Flow Rate (l/min) 0 l/min   O2 Saturation 92   Any Problems While Exercising No   Chad Dyspnea Scale 0   Chad Fatigue Scale 0   Data Measured Immediately After Walk   Did Patient Stop or Pause Before 6 Minutes No   Heart Rate 77   O2 Flow Rate (l/min) 0 l/min   O2 Saturation 93   Chad Dyspnea Scale 0   Chad Fatigue Scale 0   Data Measured 5 Minutes After Walk   Heart Rate 74   O2 Saturation 95   Comments On RA       
Amio gtt stopped per MD Kerr, Converted back to NSR HR 64.  
Attempted home o2 evaluation with patient at this time.  Patient states he does not want to ambulate but prefers to rest at this time. Will attempt to ambulate at a later time.   
OT eval order received and acknowledged. Pt screened and demonstrating baseline independence for self care tasks and functional mobility/transfers. Pt reports no need for skilled OT services at this time. OT evaluation order will therefore be discontinued this pt has no acute OT needs. Please reorder OT if pt's functional status changes. Thank you.         Functional Measure:  [facility] AM-PACTM \"6 Clicks\"                                                       Daily Activity Inpatient Short Form  How much help from another person does the patient currently need... Total; A Lot A Little None   1.  Putting on and taking off regular lower body clothing? []  1 []  2 []  3 [x]  4   2.  Bathing (including washing, rinsing, drying)? []  1 []  2 []  3 [x]  4   3.  Toileting, which includes using toilet, bedpan or urinal? [] 1 []  2 []  3 [x]  4   4.  Putting on and taking off regular upper body clothing? []  1 []  2 []  3 [x]  4   5.  Taking care of personal grooming such as brushing teeth? []  1 []  2 []  3 [x]  4   6.  Eating meals? []  1 []  2 []  3 [x]  4   © 2007, Trustees of [facility], under license to [facility]. All rights reserved     Score: 24/24     Interpretation of Tool:  Represents clinically-significant functional categories (i.e. Activities of daily living).  Percentage of Impairment CH    0%   CI    1-19% CJ    20-39% CK    40-59% CL    60-79% CM    80-99% CN     100%   [facility]  Score 6-24 24 23 20-22 15-19 10-14 7-9 6      
PT eval order received and acknowledged. Pt screened and is currently presenting with indep functional mobility/transfers. PT evaluation order will be discontinued at this time as pt has no acute PT needs. Please reorder PT if pt functional status changes. Thank you.    [facility] AM-PAC™ “6 Clicks”         Basic Mobility Inpatient Short Form  How much difficulty does the patient currently have... Unable A Lot A Little None   1.  Turning over in bed (including adjusting bedclothes, sheets and blankets)?   [] 1   [] 2   [] 3   [x] 4   2.  Sitting down on and standing up from a chair with arms ( e.g., wheelchair, bedside commode, etc.)   [] 1   [] 2   [] 3   [x] 4   3.  Moving from lying on back to sitting on the side of the bed?   [] 1   [] 2   [] 3   [x] 4          How much help from another person does the patient currently need... Total A Lot A Little None   4.  Moving to and from a bed to a chair (including a wheelchair)?   [] 1   [] 2   [] 3   [x] 4   5.  Need to walk in hospital room?   [] 1   [] 2   [] 3   [x] 4   6.  Climbing 3-5 steps with a railing?   [] 1   [] 2   [] 3   [x] 4   © 2007, Trustees of [facility], under license to [employer]. All rights reserved     Score:  Initial: 24/24 Most Recent: X (Date: 8/8/2024 )   Interpretation of Tool:  Represents activities that are increasingly more difficult (i.e. Bed mobility, Transfers, Gait).  Score 24 23 22-20 19-15 14-10 9-7 6   Modifier CH CI CJ CK CL CM CN      
PULMONARY NOTE  [facility]    Name: Andrea Rubio MRN: [id number]   : 1960 Hospital: [facility]   Date: 2024  Admission date: 2024 Hospital Day: 3       HPI:     Patient Active Problem List   Diagnosis    Atrial fibrillation with rapid ventricular response (HCC)    Primary hypertension    COPD (chronic obstructive pulmonary disease) (HCC)             [x] High complexity decision making was performed  [x] See my orders for details      Subjective/Initial History:     I was asked by Camilo Kerr MD to see Andrea Rubio  a 64 y.o.   male in consultation     Excerpts from admission 2024 or consult notes as follows:   64-year-old male came in because of shortness of breath and dyspnea he had a history of COPD and he was taking Primatene Mist came to the hospital having palpitation found to be in A-fib with RVR acute smoking 1-1/2 years ago denies any history of chest pain no fever no chills chest x-ray no acute infiltrates were admitted and pulmonary consult was called      Allergies   Allergen Reactions    Cardizem [Diltiazem] Hives and Itching        MAR reviewed and pertinent medications noted or modified as needed     Current Facility-Administered Medications   Medication Dose Route Frequency Provider Last Rate Last Admin    amiodarone (CORDARONE) tablet 200 mg  200 mg Oral BID Vance Taylor MD   200 mg at 24 0747    0.9 % sodium chloride infusion   IntraVENous Continuous Harini Morocho APRN - NP 75 mL/hr at 24 0827 New Bag at 24 0827    sodium bicarbonate tablet 650 mg  650 mg Oral BID Harini Morocho APRN - NP   650 mg at 24 0747    amiodarone (NEXTERONE) 360 mg in dextrose 5% 200 ml  0.5 mg/min IntraVENous Continuous Bita Horton MD   Stopped at 24 0548    potassium chloride (KLOR-CON M) extended release tablet 40 mEq  40 mEq Oral PRN Harini Morocho APRN - NP        Or    potassium bicarb-citric acid (EFFER-K) 
PULMONARY NOTE  [facility]    Name: Andrea Rubio MRN: [id number]   : 1960 Hospital: [facility]   Date: 2024  Admission date: 2024 Hospital Day: 6       HPI:     Patient Active Problem List   Diagnosis    Atrial fibrillation with rapid ventricular response (HCC)    Primary hypertension    COPD (chronic obstructive pulmonary disease) (HCC)             [x] High complexity decision making was performed  [x] See my orders for details      Subjective/Initial History:     I was asked by Camilo Kerr MD to see Andrea Rubio  a 64 y.o.   male in consultation     Excerpts from admission 2024 or consult notes as follows:   64-year-old male came in because of shortness of breath and dyspnea he had a history of COPD and he was taking Primatene Mist came to the hospital having palpitation found to be in A-fib with RVR acute smoking 1-1/2 years ago denies any history of chest pain no fever no chills chest x-ray no acute infiltrates were admitted and pulmonary consult was called      Allergies   Allergen Reactions    Cardizem [Diltiazem] Hives and Itching        MAR reviewed and pertinent medications noted or modified as needed     Current Facility-Administered Medications   Medication Dose Route Frequency Provider Last Rate Last Admin    potassium chloride (KLOR-CON M) extended release tablet 40 mEq  40 mEq Oral Once Cristina Garcia PA-C        glucose chewable tablet 16 g  4 tablet Oral PRN Abby Bsas PA-C        dextrose bolus 10% 125 mL  125 mL IntraVENous PRN Abby Bass PA-C        Or    dextrose bolus 10% 250 mL  250 mL IntraVENous PRN Abby Bass PA-C        glucagon injection 1 mg  1 mg SubCUTAneous PRN Abby Bass PA-C        dextrose 10 % infusion   IntraVENous Continuous PRN Abby Bass PA-C        insulin glargine (LANTUS) injection vial 10 Units  10 Units SubCUTAneous Daily Abby Bass PA-C   10 Units at 24 
PULMONARY NOTE  [facility]    Name: Andrea Rubio MRN: [id number]   : 1960 Hospital: [facility]   Date: 2024  Admission date: 2024 Hospital Day: 6       HPI:     Patient Active Problem List   Diagnosis    Atrial fibrillation with rapid ventricular response (HCC)    Primary hypertension    COPD (chronic obstructive pulmonary disease) (HCC)             [x] High complexity decision making was performed  [x] See my orders for details      Subjective/Initial History:     I was asked by Camilo Kerr MD to see Andrea Rubio  a 64 y.o.   male in consultation     Excerpts from admission 2024 or consult notes as follows:   64-year-old male came in because of shortness of breath and dyspnea he had a history of COPD and he was taking Primatene Mist came to the hospital having palpitation found to be in A-fib with RVR acute smoking 1-1/2 years ago denies any history of chest pain no fever no chills chest x-ray no acute infiltrates were admitted and pulmonary consult was called      Allergies   Allergen Reactions    Cardizem [Diltiazem] Hives and Itching        MAR reviewed and pertinent medications noted or modified as needed     Current Facility-Administered Medications   Medication Dose Route Frequency Provider Last Rate Last Admin    potassium chloride (KLOR-CON M) extended release tablet 40 mEq  40 mEq Oral Once Mateo Pro MD        potassium chloride (KLOR-CON M) extended release tablet 40 mEq  40 mEq Oral Once Cristina Garcia PA-C        glucose chewable tablet 16 g  4 tablet Oral PRN Abby Bass PA-C        dextrose bolus 10% 125 mL  125 mL IntraVENous PRN Abby Bass PA-C        Or    dextrose bolus 10% 250 mL  250 mL IntraVENous PRN Abby Bass PA-C        glucagon injection 1 mg  1 mg SubCUTAneous PRN Abby Bass PA-C        dextrose 10 % infusion   IntraVENous Continuous PRN Abby Bass PA-C        insulin glargine 
PULMONARY NOTE  [facility]    Name: Andrea Rubio MRN: [id number]   : 1960 Hospital: [facility]   Date: 2024  Admission date: 2024 Hospital Day: 7       HPI:     Patient Active Problem List   Diagnosis    Atrial fibrillation with rapid ventricular response (HCC)    Primary hypertension    COPD (chronic obstructive pulmonary disease) (HCC)             [x] High complexity decision making was performed  [x] See my orders for details      Subjective/Initial History:     I was asked by Camilo Kerr MD to see Andrea Rubio  a 64 y.o.   male in consultation     Excerpts from admission 2024 or consult notes as follows:   64-year-old male came in because of shortness of breath and dyspnea he had a history of COPD and he was taking Primatene Mist came to the hospital having palpitation found to be in A-fib with RVR acute smoking 1-1/2 years ago denies any history of chest pain no fever no chills chest x-ray no acute infiltrates were admitted and pulmonary consult was called      Allergies   Allergen Reactions    Cardizem [Diltiazem] Hives and Itching        MAR reviewed and pertinent medications noted or modified as needed     Current Facility-Administered Medications   Medication Dose Route Frequency Provider Last Rate Last Admin    warfarin (COUMADIN) tablet 5 mg  5 mg Oral Once Deion Black MD        glucose chewable tablet 16 g  4 tablet Oral PRN Abby Bass PA-C        dextrose bolus 10% 125 mL  125 mL IntraVENous PRN Abby Bass PA-C        Or    dextrose bolus 10% 250 mL  250 mL IntraVENous PRN Abby Bass PA-C        glucagon injection 1 mg  1 mg SubCUTAneous PRN Abby Bass PA-C        dextrose 10 % infusion   IntraVENous Continuous PRN Abby Bass PA-C        insulin glargine (LANTUS) injection vial 10 Units  10 Units SubCUTAneous Daily Abby Bass PA-C   10 Units at 24 1021    insulin lispro (HUMALOG,ADMELOG) 
PULMONARY NOTE  [facility]    Name: Andrea Rubio MRN: [id number]   : 1960 Hospital: [facility]   Date: 8/10/2024  Admission date: 2024 Hospital Day: 4       HPI:     Patient Active Problem List   Diagnosis    Atrial fibrillation with rapid ventricular response (HCC)    Primary hypertension    COPD (chronic obstructive pulmonary disease) (HCC)             [x] High complexity decision making was performed  [x] See my orders for details      Subjective/Initial History:     I was asked by Camilo Kerr MD to see Andrea Rubio  a 64 y.o.   male in consultation     Excerpts from admission 2024 or consult notes as follows:   64-year-old male came in because of shortness of breath and dyspnea he had a history of COPD and he was taking Primatene Mist came to the hospital having palpitation found to be in A-fib with RVR acute smoking 1-1/2 years ago denies any history of chest pain no fever no chills chest x-ray no acute infiltrates were admitted and pulmonary consult was called      Allergies   Allergen Reactions    Cardizem [Diltiazem] Hives and Itching        MAR reviewed and pertinent medications noted or modified as needed     Current Facility-Administered Medications   Medication Dose Route Frequency Provider Last Rate Last Admin    warfarin (COUMADIN) tablet 5 mg  5 mg Oral Once Shira Vanessa MD        amiodarone (CORDARONE) tablet 200 mg  200 mg Oral BID Vance Taylor MD   200 mg at 08/10/24 0903    0.9 % sodium chloride infusion   IntraVENous Continuous Harini Morocho APRN - NP 75 mL/hr at 24 New Bag at 24 211    warfarin placeholder: dosing by pharmacy   Other RX Placeholder Harini Morocho APRN - NP        lisinopril (PRINIVIL;ZESTRIL) tablet 10 mg  10 mg Oral Daily Raffy Mallory APRN - NP   10 mg at 08/10/24 0903    hydrALAZINE (APRESOLINE) injection 10 mg  10 mg IntraVENous Q4H PRN Harini Morocho APRN - NP   10 mg at 08/10/24 
Spiritual Health Assessment/Progress Note  [facility]    Advance Care Planning,  ,  ,      Name: Andrea Rubio MRN: [id number]    Age: 64 y.o.     Sex: male   Language: English   Nondenominational: Amish   Atrial fibrillation with rapid ventricular response (HCC)     Date: 8/8/2024            Total Time Calculated: 58 min              Spiritual Assessment began in [facility] [place] [facility]        Referral/Consult From: Patient   Encounter Overview/Reason: Advance Care Planning  Service Provided For: Patient    Diane, Belief, Meaning:   Patient is connected with a diane tradition or spiritual practice and Other: Amish  Family/Friends No family/friends present      Importance and Influence:  Patient has spiritual/personal beliefs that influence decisions regarding their health  Family/Friends no family/friends present    Community:  Patient feels well-supported. Support system includes: Children  Family/Friends Other: None    Assessment and Plan of Care:     Patient Interventions include: Facilitated expression of thoughts and feelings, Explored spiritual coping/struggle/distress and theological reflection, Affirmed coping skills/support systems, and Engaged in life review and/or legacy  Family/Friends Interventions include: Other: None    Patient Plan of Care: Spiritual Care available upon further referral  Family/Friends Plan of Care: Other: None    Electronically signed by Chaplain Kiara on 8/8/2024 at 12:15 PM        
VSS. Patient given discharge instructions. Medications and follow-up appointments reviewed. IV and Telemetry removed. Case management, provider, and primary nurse aware of discharge. Discharge plan of care/case management plan validated with provider discharge order.   
Warfarin Dosing Consult  Andrea Rubio is a 64 y.o. male with Afib RVR. Pharmacy was consulted by Harini Morocho NP to dose and monitor warfarin.    INR Goal: 2-3    PTA Dose: N/A    Drugs that may increase INR:Amiodarone  Drugs that may decrease INR: None  Other current anticoagulants/ drugs that may increase bleeding risk: Heparin  Risk factors: Age > 65 and Decompensated Heart Failure  Daily INR ordered: Yes    Recent Labs     08/07/24  1114 08/07/24  1537 08/09/24  0322 08/09/24  1035   HGB 16.3 14.3 13.0 14.2    340 338 382     Recent Labs     08/07/24  1114 08/07/24  1450 08/08/24  0214   ALT 32 31 34   AST 27 22 21     Recent Labs     08/07/24  1114 08/07/24  1841 08/09/24  1035   INR 1.0 1.2* 1.0       Date INR Previous Dose   8/9/24 1.0 N/A     Assessment/ Plan:  Andrea Rubio presented to the ED after feeling short of breath with an elevated heart rate in the 120s. Upon admission, the patient has been newly diagnosed with Afib. The patient underwent cardioversion to revert back to normal sinus rhythm and was started on amiodarone. He will also be dosed with heparin as bridging therapy until INR is therapeutic.   Baseline INR resulted as 1.0. Due to multiple factors increasing sensitivity to warfarin the initiation dose will be 5mg.  Will order warfarin 5 mg PO x 1 dose.  Patients hgb/plt appear stable with no signs of active bleeding.  Subsequent INR ordered for 8/10/24 @ 0600  Pharmacy will continue to monitor daily and adjust therapy as indicated.         
Warfarin Dosing Consult  Andrea Rubio is a 64 y.o. male with Afib RVR. Pharmacy was consulted by Harini Morocho NP to dose and monitor warfarin.    INR Goal: 2-3    PTA Dose: N/A    Drugs that may increase INR:Amiodarone and Macrolides  Drugs that may decrease INR: None  Other current anticoagulants/ drugs that may increase bleeding risk: Heparin  Risk factors: Age > 65 and Decompensated Heart Failure  Daily INR ordered: Yes    Recent Labs     08/07/24  1114 08/07/24  1537 08/09/24  0322 08/09/24  1035   HGB 16.3 14.3 13.0 14.2    340 338 382     Recent Labs     08/07/24  1114 08/07/24  1450 08/08/24  0214   ALT 32 31 34   AST 27 22 21     Recent Labs     08/07/24  1114 08/07/24  1841 08/09/24  1035   INR 1.0 1.2* 1.0       Date INR Previous Dose   8/9/24 1.0 N/A   8/10 1.0  5 mg     Assessment/ Plan:  Andrea Rubio presented to the ED after feeling short of breath with an elevated heart rate in the 120s. Upon admission, the patient has been newly diagnosed with Afib. The patient underwent cardioversion to revert back to normal sinus rhythm and was started on amiodarone. He will also be dosed with heparin as bridging therapy until INR is therapeutic.   Baseline INR resulted as 1.0. Due to multiple factors increasing sensitivity to warfarin the initiation dose will be 5mg.  Will order warfarin 5 mg PO x 1 dose.  Patients hgb/plt appear stable with no signs of active bleeding.  Subsequent INR ordered for 8/11/24 @ 0600  Pharmacy will continue to monitor daily and adjust therapy as indicated.           
Warfarin Dosing Consult  Andrea Rubio is a 64 y.o. male with Afib RVR. Pharmacy was consulted by Harini Morocho NP to dose and monitor warfarin.    INR Goal: 2-3    PTA Dose: N/A    Drugs that may increase INR:Amiodarone and Macrolides  Drugs that may decrease INR: None  Other current anticoagulants/ drugs that may increase bleeding risk: Heparin  Risk factors: Age > 65 and Decompensated Heart Failure  Daily INR ordered: Yes    Recent Labs     08/07/24  1114 08/07/24  1537 08/09/24  0322 08/09/24  1035   HGB 16.3 14.3 13.0 14.2    340 338 382     Recent Labs     08/07/24  1114 08/07/24  1450 08/08/24  0214   ALT 32 31 34   AST 27 22 21     Recent Labs     08/07/24  1114 08/07/24  1841 08/09/24  1035   INR 1.0 1.2* 1.0       Date INR Previous Dose   8/9/24 1.0 N/A   8/10 1.0  5 mg   8/11 1.4  5 mg     Assessment/ Plan:  Andrea Rubio presented to the ED after feeling short of breath with an elevated heart rate in the 120s. Upon admission, the patient has been newly diagnosed with Afib. The patient underwent cardioversion to revert back to normal sinus rhythm and was started on amiodarone. He will also be dosed with heparin as bridging therapy until INR is therapeutic.   INR is increasing and now 1.4, still subtherapeutic.  Patients hgb/plt appear stable with no signs of active bleeding.  Will order warfarin 5 mg PO x 1 dose.  Subsequent INR ordered for 8/12/24 @ 0600  Pharmacy will continue to monitor daily and adjust therapy as indicated.             
Warfarin Dosing Consult  Andrea Rubio is a 64 y.o. male with Afib RVR. Pharmacy was consulted by Harini Morocho NP to dose and monitor warfarin.    INR Goal: 2-3    PTA Dose: N/A    Drugs that may increase INR:Amiodarone and Macrolides  Drugs that may decrease INR: None  Other current anticoagulants/ drugs that may increase bleeding risk: Heparin  Risk factors: Age > 65 and Decompensated Heart Failure  Daily INR ordered: Yes    Recent Labs     08/11/24  0120 08/12/24  0028   HGB 12.4 13.2    324     Recent Labs     08/12/24  0028   ALT 66   AST 45*     Recent Labs     08/10/24  0410 08/11/24  0120 08/12/24  0024   INR 1.0 1.4* 1.9*       Date INR Previous Dose   8/9/24 1.0 N/A   8/10 1.0  5 mg   8/11 1.4  5 mg   8/12 1.9  5 mg     Assessment/ Plan:  Andrea Rubio presented to the ED after feeling short of breath with an elevated heart rate in the 120s. Upon admission, the patient has been newly diagnosed with Afib. The patient underwent cardioversion to revert back to normal sinus rhythm and was started on amiodarone. He will also be dosed with heparin as bridging therapy until INR is therapeutic.   INR is increasing and now 1.9, still subtherapeutic.  Patients hgb/plt appear stable with no signs of active bleeding.  Will order warfarin 6 mg PO x 1 dose.  Subsequent INR ordered through 8/17  Pharmacy will continue to monitor daily and adjust therapy as indicated.               
Warfarin Dosing Consult  Andrea Rubio is a 64 y.o. male with Afib RVR. Pharmacy was consulted by Harini Morocho NP to dose and monitor warfarin.    INR Goal: 2-3    PTA Dose: N/A    Drugs that may increase INR:Amiodarone and Macrolides  Drugs that may decrease INR: None  Other current anticoagulants/ drugs that may increase bleeding risk: Heparin  Risk factors: Age > 65 and Decompensated Heart Failure  Daily INR ordered: Yes    Recent Labs     08/11/24  0120 08/12/24  0028 08/13/24  0120   HGB 12.4 13.2 12.5    324 357     Recent Labs     08/12/24  0028 08/13/24  0120   ALT 66 96*   AST 45* 66*     Recent Labs     08/11/24  0120 08/12/24  0024 08/13/24  0120   INR 1.4* 1.9* 2.3*       Date INR Previous Dose   8/9/24 1.0 N/A   8/10 1.0  5 mg   8/11 1.4  5 mg   8/12 1.9  5 mg   8/13 2.3 6 mg     Assessment/ Plan:  Andrea Rubio presented to the ED after feeling short of breath with an elevated heart rate in the 120s. Upon admission, the patient has been newly diagnosed with Afib. The patient underwent cardioversion to revert back to normal sinus rhythm and was started on amiodarone. He will also be dosed with heparin as bridging therapy until INR is therapeutic.     INR is therapeutic and trending upward.  Give warfarin 5 mg x 1 dose and discontinue heparin drip per conversation with Dr. Black.  Patients hgb/plt appear stable with no signs of active bleeding.  Subsequent INR ordered through 8/17  Pharmacy will continue to monitor daily and adjust therapy as indicated.                 
  Pupils: Pupils are equal, round, and reactive to light.   Cardiovascular:      Rate and Rhythm: Normal rate and regular rhythm.      Pulses: Normal pulses.      Heart sounds: Normal heart sounds.   Pulmonary:      Effort: Pulmonary effort is normal.      Breath sounds: Normal breath sounds.   Abdominal:      General: Abdomen is flat. Bowel sounds are normal.      Palpations: Abdomen is soft.   Musculoskeletal:         General: Normal range of motion.      Cervical back: Normal range of motion and neck supple.   Skin:     General: Skin is warm.   Neurological:      General: No focal deficit present.      Mental Status: He is alert.          Labs:    Recent Labs     08/11/24  0120 08/12/24  0024 08/12/24  0028 08/13/24  0120   WBC 12.1*  --  16.2* 11.9*   HGB 12.4  --  13.2 12.5     --  324 357   INR 1.4* 1.9*  --  2.3*     Recent Labs     08/12/24  0028 08/12/24  1432 08/13/24  0120   * 129* 130*   K 3.1* 3.5 4.3   CL 87* 93* 98   CO2 27 29 29   GLUCOSE 305* 84 106*   BUN 15 14 11   CREATININE 1.16 1.25 1.09   CALCIUM 8.5 9.6 9.2   MG 1.9  --  2.0   BILITOT 1.0  --  0.7   AST 45*  --  66*   ALT 66  --  96*       Recent Labs     08/12/24  0803   PROBNP 885*     Lab Results   Component Value Date/Time    PROBNP 885 08/12/2024 08:03 AM      Lab Results   Component Value Date/Time    TSH 31.80 08/07/2024 03:37 PM       Results       Procedure Component Value Units Date/Time    Blood Culture 1 [[id number]] Collected: 08/07/24 1131    Order Status: Completed Specimen: Blood Updated: 08/12/24 1457     Special Requests --        No Special Requests  Left  Antecubital       Culture No growth 5 days       Blood Culture 2 [[id number]] Collected: 08/07/24 1131    Order Status: Completed Specimen: Blood Updated: 08/12/24 1457     Special Requests No Special Requests        Culture No growth 5 days                Imaging:  Echo (TTE) complete (PRN contrast/bubble/strain/3D)    Result Date: 8/7/2024    Left 
round, and reactive to light.   Cardiovascular:      Rate and Rhythm: Normal rate and regular rhythm.      Pulses: Normal pulses.      Heart sounds: Normal heart sounds.   Pulmonary:      Effort: Pulmonary effort is normal.      Breath sounds: Normal breath sounds.   Abdominal:      General: Abdomen is flat. Bowel sounds are normal.      Palpations: Abdomen is soft.   Musculoskeletal:         General: Normal range of motion.      Cervical back: Normal range of motion and neck supple.   Skin:     General: Skin is warm.   Neurological:      General: No focal deficit present.      Mental Status: He is alert.          Labs:    Recent Labs     08/12/24  0024 08/12/24  0028 08/13/24  0120 08/14/24  0150   WBC  --  16.2* 11.9* 12.0*   HGB  --  13.2 12.5 13.2   PLT  --  324 357 362   INR 1.9*  --  2.3* 2.6*     Recent Labs     08/12/24  0028 08/12/24  1432 08/13/24  0120 08/14/24  0150   * 129* 130* 129*   K 3.1* 3.5 4.3 3.3*   CL 87* 93* 98 95*   CO2 27 29 29 27   GLUCOSE 305* 84 106* 127*   BUN 15 14 11 9   CREATININE 1.16 1.25 1.09 1.01   CALCIUM 8.5 9.6 9.2 9.0   MG 1.9  --  2.0 1.9   BILITOT 1.0  --  0.7 0.8   AST 45*  --  66* 54*   ALT 66  --  96* 94*       Recent Labs     08/12/24  0803   PROBNP 885*     Lab Results   Component Value Date/Time    PROBNP 885 08/12/2024 08:03 AM      Lab Results   Component Value Date/Time    TSH 31.80 08/07/2024 03:37 PM       Results       Procedure Component Value Units Date/Time    Blood Culture 1 [[id number]] Collected: 08/07/24 1131    Order Status: Completed Specimen: Blood Updated: 08/13/24 1216     Special Requests --        No Special Requests  Left  Antecubital       Culture No growth 6 days       Blood Culture 2 [[id number]] Collected: 08/07/24 1131    Order Status: Completed Specimen: Blood Updated: 08/13/24 1216     Special Requests No Special Requests        Culture No growth 6 days                Imaging:  Echo (TTE) complete (PRN 
  --   --    INR  --   --  1.0 1.0       Signed: CONNIE Swartz - NP   
contrast/bubble/strain/3D)    Result Date: 8/7/2024    Left Ventricle: Severely reduced left ventricular systolic function with a visually estimated EF of 25 - 30%.EF difficult to calculate due to AF. Left ventricle size is normal. Normal wall thickness. Global hypokinesis present. Grade II diastolic dysfunction with increased LAP.   Tricuspid Valve: Mild regurgitation. Est RA pressure is 8 mmHg. The estimated RVSP is 26 mmHg.   Aorta: Dilated aortic root. Ao root diameter is 4.1 cm. Dilated ascending aorta. Ao ascending diameter is 4.1 cm.   Image quality is good.     XR CHEST PORTABLE    Result Date: 8/7/2024  EXAM:  XR CHEST PORTABLE INDICATION: Shortness of breath COMPARISON: none TECHNIQUE: 1127 hours portable chest AP view FINDINGS: The cardiac silhouette is within normal limits. The pulmonary vasculature is within normal limits. The lungs and pleural spaces are clear. The visualized bones and upper abdomen are age-appropriate.     No acute process on portable chest. Electronically signed by BRIDGET LEE       ARTERIAL BLOOD GAS      IMPRESSION:   A-fib with RVR converted back to sinus rhythm  Chronic Obstructive Pulmonary Disease   HFrEF ejection fraction about 25 to 30%  Hypertension      RECOMMENDATIONS/PLAN:     64-year-old male came in because of shortness of the palpitation found to be in A-fib with RVR heart rate was around 120 patient now on amiodarone  He converted back to sinus rhythm now on oral amiodarone and Coumadin has been started remains on IV heparin  History of COPD he was taking Primatene Mist probably causing tachycardia discussed with him to discontinue it start patient on Symbicort inhaler when stable need long-term maintenance inhalers and rescue inhaler as needed  Chest x-ray no acute infiltrate  He is on lisinopril recommend to change it to losartan  Overnight pulse oximetry shows desaturation discussed with him he needs nocturnal oxygen 2 L nasal cannula       8/12 patient is alert

## 2024-08-14 NOTE — DISCHARGE SUMMARY
CM consulted for setting oxygen up.  Completed azithromycin therapy for COPD exacerbation.    Discharge Exam:  Patient seen and examined by me on discharge day.  Patient has no acute complaints at this time, ready for discharge.  Denies any shortness of breath, chest pain, abdominal pain.  Discussed the importance of following up with primary care, cardiology  Pertinent Findings:  Patient Vitals for the past 24 hrs:   BP Temp Temp src Pulse Resp SpO2 Weight   08/14/24 1121 119/72 97.5 °F (36.4 °C) Oral 56 18 100 % --   08/14/24 0803 -- -- -- -- -- 98 % --   08/14/24 0748 125/69 97.5 °F (36.4 °C) Oral 78 18 98 % --   08/14/24 0421 132/73 98.5 °F (36.9 °C) Oral 64 18 96 % 60.2 kg (132 lb 11.5 oz)   08/14/24 0019 131/74 98.4 °F (36.9 °C) Oral 67 18 94 % --   08/13/24 2045 (!) 146/79 98.2 °F (36.8 °C) Oral 78 18 -- --   08/13/24 2014 -- -- -- -- -- 95 % --   08/13/24 1501 -- -- -- 75 18 95 % --   08/13/24 1438 (!) 176/92 98.5 °F (36.9 °C) Oral 94 18 97 % --       Gen:    Not in distress  Chest: Clear lungs  CVS:   Regular rhythm.  No edema  Abd:  Soft, not distended, not tender  Neuro: Alert and oriented x 3    Discharge/Recent Laboratory Results:  Recent Labs     08/14/24  0150   *   K 3.3*   CL 95*   CO2 27   BUN 9   CREATININE 1.01   GLUCOSE 127*   CALCIUM 9.0   MG 1.9     Recent Labs     08/14/24  0150   HGB 13.2   HCT 36.7   WBC 12.0*          Discharge Medications:     Medication List        START taking these medications      albuterol sulfate  (90 Base) MCG/ACT inhaler  Commonly known as: PROVENTIL;VENTOLIN;PROAIR  Inhale 2 puffs into the lungs every 4 hours as needed for Wheezing     budesonide-formoterol 160-4.5 MCG/ACT Aero  Commonly known as: SYMBICORT  Inhale 2 puffs into the lungs in the morning and 2 puffs in the evening.     hydrALAZINE 25 MG tablet  Commonly known as: APRESOLINE  Take 1 tablet by mouth every 8 hours     lisinopril 20 MG tablet  Commonly known as:

## 2024-08-14 NOTE — PLAN OF CARE
Problem: Discharge Planning  Goal: Discharge to home or other facility with appropriate resources  8/13/2024 2312 by Angela Newell, RN  Outcome: Progressing  8/13/2024 1139 by Marlys Rivera, RN  Outcome: Progressing  Flowsheets (Taken 8/13/2024 [id number])  Discharge to home or other facility with appropriate resources:   Identify barriers to discharge with patient and caregiver   Arrange for needed discharge resources and transportation as appropriate   Identify discharge learning needs (meds, wound care, etc)   Refer to discharge planning if patient needs post-hospital services based on physician order or complex needs related to functional status, cognitive ability or social support system     Problem: Pain  Goal: Verbalizes/displays adequate comfort level or baseline comfort level  8/13/2024 2312 by Angela Newell, RN  Outcome: Progressing  8/13/2024 1139 by Marlys Rivera, RN  Outcome: Progressing     Problem: Safety - Adult  Goal: Free from fall injury  8/13/2024 2312 by Angela Newell, RN  Outcome: Progressing  8/13/2024 1139 by Marlys Rivera, RN  Outcome: Progressing

## 2024-08-14 NOTE — PLAN OF CARE
Problem: Discharge Planning  Goal: Discharge to home or other facility with appropriate resources  8/14/2024 1043 by Shyanne Barrios RN  Outcome: Adequate for Discharge  8/13/2024 2312 by Angela Newell, RN  Outcome: Progressing     Problem: Pain  Goal: Verbalizes/displays adequate comfort level or baseline comfort level  8/14/2024 1043 by Shyanne Barrios, RN  Outcome: Adequate for Discharge  8/13/2024 2312 by Angela Newell, RN  Outcome: Progressing     Problem: Safety - Adult  Goal: Free from fall injury  8/14/2024 1043 by Shyanne Barrios, RN  Outcome: Adequate for Discharge  8/13/2024 2312 by Angela Newell, RN  Outcome: Progressing

## 2024-08-14 NOTE — CARE COORDINATION
Patient is clear to d/c home with Varun ZHAO, SOC 8/23/24 after patient sees new PCP on 8/22.  CM spoke with attending regarding INR checks, she is agreeable to patient not having checks until that time.    Patient has been referred to [employer] for home O2, patient provided with contact information for agency in order to set up private pay home o2.    Transition of Care Plan:    RUR: 10%  Prior Level of Functioning: independent   Disposition: HH  If SNF or IPR: Date FOC offered:   Date FOC received:   Accepting facility: [facility]   Date authorization started with reference number:   Date authorization received and expires:   Follow up appointments: yes  DME needed:   Transportation at discharge: patient arranged  IM/IMM Medicare/ letter given: previously given  Is patient a [place] and connected with [facility]?    If yes, was  transfer form completed and [facility] notified?   Caregiver Contact:   Discharge Caregiver contacted prior to discharge? Patient aware  Care Conference needed?   Barriers to discharge: n/a